# Patient Record
Sex: MALE | Race: WHITE | NOT HISPANIC OR LATINO | Employment: FULL TIME | URBAN - METROPOLITAN AREA
[De-identification: names, ages, dates, MRNs, and addresses within clinical notes are randomized per-mention and may not be internally consistent; named-entity substitution may affect disease eponyms.]

---

## 2020-10-08 RX ORDER — BLOOD SUGAR DIAGNOSTIC
STRIP MISCELLANEOUS
Qty: 400 EACH | Refills: 3 | Status: SHIPPED | OUTPATIENT
Start: 2020-10-08 | End: 2020-12-31 | Stop reason: SDUPTHER

## 2020-10-08 RX ORDER — BLOOD SUGAR DIAGNOSTIC
STRIP MISCELLANEOUS
Qty: 400 EACH | Refills: 3 | Status: SHIPPED | OUTPATIENT
Start: 2020-10-08 | End: 2020-10-08 | Stop reason: SDUPTHER

## 2020-11-06 RX ORDER — INSULIN DETEMIR 100 [IU]/ML
45 INJECTION, SOLUTION SUBCUTANEOUS 2 TIMES DAILY
Qty: 81 ML | Refills: 3 | Status: SHIPPED | OUTPATIENT
Start: 2020-11-06 | End: 2020-11-10 | Stop reason: SDUPTHER

## 2020-11-06 NOTE — TELEPHONE ENCOUNTER
PT'S WIFE CALLED STATING THAT THE PT WOULD LIKE LEVEMIR SENT IN TO [facility]. PLEASE AND THANK YOU

## 2020-11-10 RX ORDER — INSULIN DETEMIR 100 [IU]/ML
INJECTION, SOLUTION SUBCUTANEOUS
Qty: 270 ML | Refills: 1 | Status: SHIPPED | OUTPATIENT
Start: 2020-11-10 | End: 2021-08-17 | Stop reason: SDUPTHER

## 2020-11-10 NOTE — TELEPHONE ENCOUNTER
[facility] pharmacy called requesting a refill for insulin detemir (Levemir FlexTouch) 100 UNIT/ML injection. Patient needs a new script and pharmacy is requesting for it to be Escripted.

## 2020-12-18 ENCOUNTER — TELEPHONE (OUTPATIENT)
Dept: ENDOCRINOLOGY | Facility: CLINIC | Age: 65
End: 2020-12-18

## 2020-12-18 NOTE — TELEPHONE ENCOUNTER
PATIENT'S SPOUSE CALLED STATING THAT MR GIVENS IS CURRENTLY IN [place] AND IS NEEDING AN EMERGENCY REFILL ON PEN NEEDLES FOR HIS LEVEMIR AND HUMALOG PENS. CAN AN RX BE SENT TO [facility] IN [place]. PHONE # [phone]. SPOUSE DID NOT KNOW WHERE THIS PHARMACY IS LOCATED, WAS ONLY PROVIDED THE PHONE # BY MR GIVENS.

## 2020-12-31 ENCOUNTER — OFFICE VISIT (OUTPATIENT)
Dept: ENDOCRINOLOGY | Facility: CLINIC | Age: 65
End: 2020-12-31

## 2020-12-31 VITALS
DIASTOLIC BLOOD PRESSURE: 78 MMHG | BODY MASS INDEX: 33.97 KG/M2 | SYSTOLIC BLOOD PRESSURE: 150 MMHG | OXYGEN SATURATION: 96 % | WEIGHT: 250.8 LBS | TEMPERATURE: 96.6 F | HEIGHT: 72 IN | HEART RATE: 54 BPM

## 2020-12-31 DIAGNOSIS — E11.649 TYPE 2 DIABETES MELLITUS WITH HYPOGLYCEMIA WITHOUT COMA, WITH LONG-TERM CURRENT USE OF INSULIN (HCC): ICD-10-CM

## 2020-12-31 DIAGNOSIS — I10 BENIGN HYPERTENSION: ICD-10-CM

## 2020-12-31 DIAGNOSIS — I25.10 ATHEROSCLEROSIS OF NATIVE CORONARY ARTERY OF NATIVE HEART WITHOUT ANGINA PECTORIS: ICD-10-CM

## 2020-12-31 DIAGNOSIS — Z79.4 INSULIN LONG-TERM USE (HCC): ICD-10-CM

## 2020-12-31 DIAGNOSIS — E11.65 UNCONTROLLED TYPE 2 DIABETES MELLITUS WITH HYPERGLYCEMIA (HCC): Primary | ICD-10-CM

## 2020-12-31 DIAGNOSIS — Z79.4 TYPE 2 DIABETES MELLITUS WITH HYPOGLYCEMIA WITHOUT COMA, WITH LONG-TERM CURRENT USE OF INSULIN (HCC): ICD-10-CM

## 2020-12-31 PROBLEM — E78.2 MIXED HYPERLIPIDEMIA: Status: ACTIVE | Noted: 2020-12-31

## 2020-12-31 LAB
EXPIRATION DATE: NORMAL
HBA1C MFR BLD: 7.7 %
Lab: NORMAL

## 2020-12-31 PROCEDURE — 83036 HEMOGLOBIN GLYCOSYLATED A1C: CPT | Performed by: INTERNAL MEDICINE

## 2020-12-31 PROCEDURE — 99214 OFFICE O/P EST MOD 30 MIN: CPT | Performed by: INTERNAL MEDICINE

## 2020-12-31 RX ORDER — ATORVASTATIN CALCIUM 40 MG/1
TABLET, FILM COATED ORAL
COMMUNITY
Start: 2020-11-10

## 2020-12-31 RX ORDER — EMPAGLIFLOZIN, METFORMIN HYDROCHLORIDE 12.5; 1 MG/1; MG/1
2 TABLET, EXTENDED RELEASE ORAL DAILY
Qty: 60 TABLET | Refills: 5 | Status: SHIPPED | OUTPATIENT
Start: 2020-12-31 | End: 2021-01-04 | Stop reason: SDUPTHER

## 2020-12-31 RX ORDER — BLOOD SUGAR DIAGNOSTIC
STRIP MISCELLANEOUS
Qty: 400 EACH | Refills: 3 | Status: SHIPPED | OUTPATIENT
Start: 2020-12-31 | End: 2021-01-04 | Stop reason: SDUPTHER

## 2020-12-31 RX ORDER — INSULIN LISPRO 100 [IU]/ML
INJECTION, SOLUTION INTRAVENOUS; SUBCUTANEOUS
COMMUNITY
End: 2021-08-17 | Stop reason: SDUPTHER

## 2020-12-31 RX ORDER — SITAGLIPTIN AND METFORMIN HYDROCHLORIDE 1000; 50 MG/1; MG/1
TABLET, FILM COATED, EXTENDED RELEASE ORAL
COMMUNITY
Start: 2020-12-17 | End: 2020-12-31

## 2020-12-31 RX ORDER — LOSARTAN POTASSIUM AND HYDROCHLOROTHIAZIDE 12.5; 5 MG/1; MG/1
TABLET ORAL
COMMUNITY
Start: 2020-11-10

## 2020-12-31 RX ORDER — FUROSEMIDE 20 MG/1
TABLET ORAL
COMMUNITY
Start: 2020-11-10

## 2020-12-31 RX ORDER — METOPROLOL SUCCINATE 100 MG/1
TABLET, EXTENDED RELEASE ORAL
COMMUNITY
Start: 2020-11-10

## 2020-12-31 RX ORDER — ASPIRIN 325 MG
TABLET ORAL
COMMUNITY

## 2020-12-31 NOTE — PROGRESS NOTES
"     Office Note      Date: 2020  Patient Name: River Ching  MRN: [id number]  : 1955    Chief Complaint   Patient presents with   • Diabetes       History of Present Illness:   River Ching is a 65 y.o. male who presents for Diabetes type 2. Diagnosed in: . Treated in past with oral agents. Current treatments: janumet and basal bolus insulin. Number of insulin shots per day: 4. Checks blood sugar 4 times a day. Has low blood sugar: occasional. Aspirin use: Yes. Statin use: Yes. ACE-I/ARB use: Yes. Changes in health since last visit: none. Last eye exam .    Subjective      Diabetic Complications:  Eyes: No  Kidneys: No  Feet: No  Heart: Yes - stents    Diet and Exercise:  Meals per day: 3  Minutes of exercise per week: 0 mins.    Review of Systems:   Review of Systems   Constitutional: Negative.    Cardiovascular: Negative.    Gastrointestinal: Negative.    Endocrine: Negative.        The following portions of the patient's history were reviewed and updated as appropriate: allergies, current medications, past family history, past medical history, past social history, past surgical history and problem list.    Objective       Visit Vitals  /78   Pulse 54   Temp 96.6 °F (35.9 °C) (Infrared)   Ht 182.9 cm (72\")   Wt 114 kg (250 lb 12.8 oz)   SpO2 96%   BMI 34.01 kg/m²       Physical Exam:  Physical Exam  Constitutional:       Appearance: Normal appearance.   Neurological:      Mental Status: He is alert.         Labs:    HbA1c  Lab Results   Component Value Date    HGBA1C 7.7 2020       CMP  No results found for: GLUCOSE, BUN, CREATININE, EGFRIFNONA, EGFRIFAFRI, BCR, K, CO2, CALCIUM, PROTENTOTREF, LABIL2, BILIRUBIN, AST, ALT     Lipid Panel        TSH  No results found for: TSH, FREET4     Hemoglobin A1C  Lab Results   Component Value Date    HGBA1C 7.7 2020        Microalbumin/Creatinine  No results found for: MALBCRERATIO, CREATINIURIN, MICROALBUR        Assessment / Plan  "     Assessment & Plan:  Problem List Items Addressed This Visit        Other    Uncontrolled type 2 diabetes mellitus with hyperglycemia (CMS/[facility]) - Primary    Current Assessment & Plan     Diabetes is improving with treatment.   Continue current treatment regimen.  Diabetes will be reassessed in 3 months.    A1c better but above goal.  We discussed change from janumet to SGLT-2 inhibitor, especially with h/o CAD.         Relevant Medications    insulin detemir (Levemir FlexTouch) 100 UNIT/ML injection    Insulin Lispro, 1 Unit Dial, (HumaLOG KwikPen) 100 UNIT/ML solution pen-injector    Empagliflozin-metFORMIN HCl ER (Synjardy XR) 12.5-1000 MG tablet sustained-release 24 hour    Other Relevant Orders    POC Glycosylated Hemoglobin (Hb A1C) (Completed)    Type 2 diabetes mellitus with hypoglycemia, with long-term current use of insulin (CMS/[facility])    Current Assessment & Plan     We have prescribed CGM but it was too expensive unfortunately.         Relevant Medications    insulin detemir (Levemir FlexTouch) 100 UNIT/ML injection    Insulin Lispro, 1 Unit Dial, (HumaLOG KwikPen) 100 UNIT/ML solution pen-injector    Empagliflozin-metFORMIN HCl ER (Synjardy XR) 12.5-1000 MG tablet sustained-release 24 hour    Benign hypertension    Current Assessment & Plan     Hypertension is unchanged.  Continue current treatment regimen. But add SGLT-2 inhibitor.  Blood pressure will be reassessed in 3 months.         Relevant Medications    metoprolol succinate XL (TOPROL-XL) 100 MG 24 hr tablet    losartan-hydrochlorothiazide (HYZAAR) 50-12.5 MG per tablet    furosemide (LASIX) 20 MG tablet    Atherosclerosis of native coronary artery of native heart without angina pectoris    Current Assessment & Plan     Coronary artery disease is unchanged.  Medication changes per orders.  Cardiac status will be reassessed in 3 months.    Start SGLT-2 inhibitor.         Relevant Medications    metoprolol succinate XL (TOPROL-XL) 100 MG 24 hr  tablet    Insulin long-term use (CMS/[facility])           Return in about 3 months (around 3/31/2021) for Recheck with A1c, CMP.    Dat Manning MD   12/31/2020

## 2020-12-31 NOTE — ASSESSMENT & PLAN NOTE
Hypertension is unchanged.  Continue current treatment regimen. But add SGLT-2 inhibitor.  Blood pressure will be reassessed in 3 months.

## 2020-12-31 NOTE — ASSESSMENT & PLAN NOTE
Coronary artery disease is unchanged.  Medication changes per orders.  Cardiac status will be reassessed in 3 months.    Start SGLT-2 inhibitor.

## 2020-12-31 NOTE — ASSESSMENT & PLAN NOTE
Diabetes is improving with treatment.   Continue current treatment regimen.  Diabetes will be reassessed in 3 months.    A1c better but above goal.  We discussed change from janumet to SGLT-2 inhibitor, especially with h/o CAD.

## 2021-01-04 RX ORDER — EMPAGLIFLOZIN, METFORMIN HYDROCHLORIDE 12.5; 1 MG/1; MG/1
2 TABLET, EXTENDED RELEASE ORAL DAILY
Qty: 60 TABLET | Refills: 5 | Status: SHIPPED | OUTPATIENT
Start: 2021-01-04 | End: 2021-01-04 | Stop reason: SDUPTHER

## 2021-01-04 RX ORDER — EMPAGLIFLOZIN, METFORMIN HYDROCHLORIDE 12.5; 1 MG/1; MG/1
2 TABLET, EXTENDED RELEASE ORAL DAILY
Qty: 180 TABLET | Refills: 1 | Status: SHIPPED | OUTPATIENT
Start: 2021-01-04 | End: 2021-08-17 | Stop reason: SDUPTHER

## 2021-01-04 RX ORDER — BLOOD SUGAR DIAGNOSTIC
STRIP MISCELLANEOUS
Qty: 400 EACH | Refills: 3 | Status: SHIPPED | OUTPATIENT
Start: 2021-01-04 | End: 2021-12-29 | Stop reason: SDUPTHER

## 2021-01-04 RX ORDER — EMPAGLIFLOZIN AND METFORMIN HYDROCHLORIDE 12.5; 1 MG/1; MG/1
TABLET ORAL
Qty: 60 TABLET | Status: CANCELLED | OUTPATIENT
Start: 2021-01-04

## 2021-01-04 NOTE — TELEPHONE ENCOUNTER
Patient's wife called and said the medication that was ordered on 12/31 was sent to the wrong location. Please send an order of the Synjardy and Verio to [facility].

## 2021-02-19 RX ORDER — PEN NEEDLE, DIABETIC 30 GX3/16"
1 NEEDLE, DISPOSABLE MISCELLANEOUS
Qty: 30 EACH | Refills: 0 | Status: SHIPPED | OUTPATIENT
Start: 2021-02-19 | End: 2021-12-29 | Stop reason: SDUPTHER

## 2021-04-08 ENCOUNTER — LAB (OUTPATIENT)
Dept: LAB | Facility: HOSPITAL | Age: 66
End: 2021-04-08

## 2021-04-08 ENCOUNTER — OFFICE VISIT (OUTPATIENT)
Dept: ENDOCRINOLOGY | Facility: CLINIC | Age: 66
End: 2021-04-08

## 2021-04-08 VITALS
HEIGHT: 72 IN | TEMPERATURE: 97.1 F | SYSTOLIC BLOOD PRESSURE: 130 MMHG | WEIGHT: 238.2 LBS | BODY MASS INDEX: 32.26 KG/M2 | DIASTOLIC BLOOD PRESSURE: 80 MMHG

## 2021-04-08 DIAGNOSIS — Z79.4 INSULIN LONG-TERM USE (HCC): ICD-10-CM

## 2021-04-08 DIAGNOSIS — E11.65 UNCONTROLLED TYPE 2 DIABETES MELLITUS WITH HYPERGLYCEMIA (HCC): ICD-10-CM

## 2021-04-08 DIAGNOSIS — E11.649 TYPE 2 DIABETES MELLITUS WITH HYPOGLYCEMIA WITHOUT COMA, WITH LONG-TERM CURRENT USE OF INSULIN (HCC): ICD-10-CM

## 2021-04-08 DIAGNOSIS — I10 BENIGN HYPERTENSION: ICD-10-CM

## 2021-04-08 DIAGNOSIS — I25.10 ATHEROSCLEROSIS OF NATIVE CORONARY ARTERY OF NATIVE HEART WITHOUT ANGINA PECTORIS: ICD-10-CM

## 2021-04-08 DIAGNOSIS — E11.65 UNCONTROLLED TYPE 2 DIABETES MELLITUS WITH HYPERGLYCEMIA (HCC): Primary | ICD-10-CM

## 2021-04-08 DIAGNOSIS — Z79.4 TYPE 2 DIABETES MELLITUS WITH HYPOGLYCEMIA WITHOUT COMA, WITH LONG-TERM CURRENT USE OF INSULIN (HCC): ICD-10-CM

## 2021-04-08 LAB
ALBUMIN SERPL-MCNC: 4.5 G/DL (ref 3.5–5.2)
ALBUMIN/GLOB SERPL: 1.4 G/DL
ALP SERPL-CCNC: 81 U/L (ref 39–117)
ALT SERPL W P-5'-P-CCNC: 20 U/L (ref 1–41)
ANION GAP SERPL CALCULATED.3IONS-SCNC: 8.3 MMOL/L (ref 5–15)
AST SERPL-CCNC: 21 U/L (ref 1–40)
BILIRUB SERPL-MCNC: 0.7 MG/DL (ref 0–1.2)
BUN SERPL-MCNC: 12 MG/DL (ref 8–23)
BUN/CREAT SERPL: 14.3 (ref 7–25)
CALCIUM SPEC-SCNC: 9.4 MG/DL (ref 8.6–10.5)
CHLORIDE SERPL-SCNC: 103 MMOL/L (ref 98–107)
CO2 SERPL-SCNC: 29.7 MMOL/L (ref 22–29)
CREAT SERPL-MCNC: 0.84 MG/DL (ref 0.76–1.27)
EXPIRATION DATE: NORMAL
GFR SERPL CREATININE-BSD FRML MDRD: 91 ML/MIN/1.73
GLOBULIN UR ELPH-MCNC: 3.3 GM/DL
GLUCOSE SERPL-MCNC: 121 MG/DL (ref 65–99)
HBA1C MFR BLD: 6.8 %
Lab: NORMAL
POTASSIUM SERPL-SCNC: 4.1 MMOL/L (ref 3.5–5.2)
PROT SERPL-MCNC: 7.8 G/DL (ref 6–8.5)
SODIUM SERPL-SCNC: 141 MMOL/L (ref 136–145)

## 2021-04-08 PROCEDURE — 83036 HEMOGLOBIN GLYCOSYLATED A1C: CPT | Performed by: INTERNAL MEDICINE

## 2021-04-08 PROCEDURE — 80053 COMPREHEN METABOLIC PANEL: CPT

## 2021-04-08 PROCEDURE — 99214 OFFICE O/P EST MOD 30 MIN: CPT | Performed by: INTERNAL MEDICINE

## 2021-04-08 NOTE — PROGRESS NOTES
"     Office Note      Date: 2021  Patient Name: River Ching  MRN: [id number]  : 1955    Chief Complaint   Patient presents with   • Follow-up   • Diabetes       History of Present Illness:   River Ching is a 66 y.o. male who presents for Diabetes type 2. Diagnosed in: . Treated in past with oral agents. Current treatments: synjardy and basal bolus insulin. Number of insulin shots per day: 2. Checks blood sugar 4 times a day. Has low blood sugar: occasional. Aspirin use: Yes. Statin use: Yes. ACE-I/ARB use: Yes. Changes in health since last visit: none. Last eye exam .    At the last visit we changed janumet to synjardy.  His FSBS have improved - he has had to decrease insulin.  He has lost weight.    Subjective      Diabetic Complications:  Eyes: No  Kidneys: No  Feet: No  Heart: Yes - stents    Diet and Exercise:  Meals per day: 3  Minutes of exercise per week: 0 mins.    Review of Systems:   Review of Systems   Constitutional: Negative.    Cardiovascular: Negative.    Gastrointestinal: Negative.    Endocrine: Negative.        The following portions of the patient's history were reviewed and updated as appropriate: allergies, current medications, past family history, past medical history, past social history, past surgical history and problem list.    Objective       Visit Vitals  /80   Temp 97.1 °F (36.2 °C) (Infrared)   Ht 182.9 cm (72\")   Wt 108 kg (238 lb 3.2 oz)   BMI 32.31 kg/m²       Physical Exam:  Physical Exam  Constitutional:       Appearance: Normal appearance.   Neurological:      Mental Status: He is alert.         Labs:    HbA1c  Lab Results   Component Value Date    HGBA1C 6.8 2021       CMP  No results found for: GLUCOSE, BUN, CREATININE, EGFRIFNONA, EGFRIFAFRI, BCR, K, CO2, CALCIUM, PROTENTOTREF, LABIL2, BILIRUBIN, AST, ALT     Lipid Panel        TSH  No results found for: TSH, FREET4     Hemoglobin A1C  Lab Results   Component Value Date    HGBA1C 6.8 " 04/08/2021        Microalbumin/Creatinine  No results found for: MALBCRERATIO, CREATINIURIN, MICROALBUR        Assessment / Plan      Assessment & Plan:  Diagnoses and all orders for this visit:    1. Uncontrolled type 2 diabetes mellitus with hyperglycemia (CMS/HCC) (Primary)  Assessment & Plan:  Diabetes is improving with treatment.   Continue current treatment regimen.  Diabetes will be reassessed in 3 months.    Orders:  -     POC Glycosylated Hemoglobin (Hb A1C)  -     Comprehensive Metabolic Panel; Future    2. Type 2 diabetes mellitus with hypoglycemia without coma, with long-term current use of insulin (CMS/[facility])  Assessment & Plan:  Continue frequent FSBS.  Continue to decrease insulin as needed.      3. Benign hypertension  Assessment & Plan:  Hypertension is improving with treatment.  Continue current treatment regimen.  Blood pressure will be reassessed at the next regular appointment.      4. Atherosclerosis of native coronary artery of native heart without angina pectoris  Assessment & Plan:  Continue ASA, statin, and SGLT-2 inhibitor.      5. Insulin long-term use (CMS/[facility])      Return in about 3 months (around 7/8/2021) for Recheck with A1c, CMP, lipids, TSH, microalbumin.    Dat Manning MD   04/08/2021

## 2021-08-05 RX ORDER — FLURBIPROFEN SODIUM 0.3 MG/ML
SOLUTION/ DROPS OPHTHALMIC
Qty: 450 EACH | Refills: 1 | Status: SHIPPED | OUTPATIENT
Start: 2021-08-05 | End: 2021-12-29 | Stop reason: SDUPTHER

## 2021-08-17 ENCOUNTER — LAB (OUTPATIENT)
Dept: LAB | Facility: HOSPITAL | Age: 66
End: 2021-08-17

## 2021-08-17 ENCOUNTER — MEDICATION THERAPY MANAGEMENT (OUTPATIENT)
Dept: ENDOCRINOLOGY | Facility: CLINIC | Age: 66
End: 2021-08-17

## 2021-08-17 ENCOUNTER — OFFICE VISIT (OUTPATIENT)
Dept: ENDOCRINOLOGY | Facility: CLINIC | Age: 66
End: 2021-08-17

## 2021-08-17 VITALS
WEIGHT: 221 LBS | SYSTOLIC BLOOD PRESSURE: 108 MMHG | DIASTOLIC BLOOD PRESSURE: 66 MMHG | HEART RATE: 55 BPM | BODY MASS INDEX: 29.93 KG/M2 | HEIGHT: 72 IN | OXYGEN SATURATION: 96 %

## 2021-08-17 DIAGNOSIS — E11.65 UNCONTROLLED TYPE 2 DIABETES MELLITUS WITH HYPERGLYCEMIA (HCC): ICD-10-CM

## 2021-08-17 DIAGNOSIS — I25.10 ATHEROSCLEROSIS OF NATIVE CORONARY ARTERY OF NATIVE HEART WITHOUT ANGINA PECTORIS: ICD-10-CM

## 2021-08-17 DIAGNOSIS — Z79.4 INSULIN LONG-TERM USE (HCC): ICD-10-CM

## 2021-08-17 DIAGNOSIS — E78.2 MIXED HYPERLIPIDEMIA: ICD-10-CM

## 2021-08-17 DIAGNOSIS — Z79.4 TYPE 2 DIABETES MELLITUS WITH HYPOGLYCEMIA WITHOUT COMA, WITH LONG-TERM CURRENT USE OF INSULIN (HCC): ICD-10-CM

## 2021-08-17 DIAGNOSIS — E11.65 UNCONTROLLED TYPE 2 DIABETES MELLITUS WITH HYPERGLYCEMIA (HCC): Primary | ICD-10-CM

## 2021-08-17 DIAGNOSIS — E11.649 TYPE 2 DIABETES MELLITUS WITH HYPOGLYCEMIA WITHOUT COMA, WITH LONG-TERM CURRENT USE OF INSULIN (HCC): ICD-10-CM

## 2021-08-17 DIAGNOSIS — I10 BENIGN HYPERTENSION: ICD-10-CM

## 2021-08-17 LAB
ALBUMIN SERPL-MCNC: 4.4 G/DL (ref 3.5–5.2)
ALBUMIN UR-MCNC: <1.2 MG/DL
ALBUMIN/GLOB SERPL: 1.3 G/DL
ALP SERPL-CCNC: 88 U/L (ref 39–117)
ALT SERPL W P-5'-P-CCNC: 13 U/L (ref 1–41)
ANION GAP SERPL CALCULATED.3IONS-SCNC: 12.1 MMOL/L (ref 5–15)
AST SERPL-CCNC: 18 U/L (ref 1–40)
BILIRUB SERPL-MCNC: 1 MG/DL (ref 0–1.2)
BUN SERPL-MCNC: 10 MG/DL (ref 8–23)
BUN/CREAT SERPL: 11.2 (ref 7–25)
CALCIUM SPEC-SCNC: 9.8 MG/DL (ref 8.6–10.5)
CHLORIDE SERPL-SCNC: 101 MMOL/L (ref 98–107)
CHOLEST SERPL-MCNC: 122 MG/DL (ref 0–200)
CO2 SERPL-SCNC: 27.9 MMOL/L (ref 22–29)
CREAT SERPL-MCNC: 0.89 MG/DL (ref 0.76–1.27)
CREAT UR-MCNC: 61 MG/DL
EXPIRATION DATE: ABNORMAL
EXPIRATION DATE: NORMAL
GFR SERPL CREATININE-BSD FRML MDRD: 85 ML/MIN/1.73
GLOBULIN UR ELPH-MCNC: 3.3 GM/DL
GLUCOSE BLDC GLUCOMTR-MCNC: 151 MG/DL (ref 70–130)
GLUCOSE SERPL-MCNC: 140 MG/DL (ref 65–99)
HBA1C MFR BLD: 6.9 %
HDLC SERPL-MCNC: 35 MG/DL (ref 40–60)
LDLC SERPL CALC-MCNC: 66 MG/DL (ref 0–100)
LDLC/HDLC SERPL: 1.85 {RATIO}
Lab: ABNORMAL
Lab: NORMAL
MICROALBUMIN/CREAT UR: NORMAL MG/G{CREAT}
POTASSIUM SERPL-SCNC: 4.7 MMOL/L (ref 3.5–5.2)
PROT SERPL-MCNC: 7.7 G/DL (ref 6–8.5)
SODIUM SERPL-SCNC: 141 MMOL/L (ref 136–145)
TRIGL SERPL-MCNC: 112 MG/DL (ref 0–150)
TSH SERPL DL<=0.05 MIU/L-ACNC: 0.89 UIU/ML (ref 0.27–4.2)
VLDLC SERPL-MCNC: 21 MG/DL (ref 5–40)

## 2021-08-17 PROCEDURE — 99214 OFFICE O/P EST MOD 30 MIN: CPT | Performed by: INTERNAL MEDICINE

## 2021-08-17 PROCEDURE — 80061 LIPID PANEL: CPT

## 2021-08-17 PROCEDURE — 80053 COMPREHEN METABOLIC PANEL: CPT

## 2021-08-17 PROCEDURE — 83036 HEMOGLOBIN GLYCOSYLATED A1C: CPT | Performed by: INTERNAL MEDICINE

## 2021-08-17 PROCEDURE — 82043 UR ALBUMIN QUANTITATIVE: CPT

## 2021-08-17 PROCEDURE — 84443 ASSAY THYROID STIM HORMONE: CPT

## 2021-08-17 PROCEDURE — 82570 ASSAY OF URINE CREATININE: CPT

## 2021-08-17 PROCEDURE — 82947 ASSAY GLUCOSE BLOOD QUANT: CPT | Performed by: INTERNAL MEDICINE

## 2021-08-17 RX ORDER — INSULIN DETEMIR 100 [IU]/ML
40 INJECTION, SOLUTION SUBCUTANEOUS 2 TIMES DAILY
Qty: 75 ML | Refills: 1 | Status: SHIPPED | OUTPATIENT
Start: 2021-08-17 | End: 2021-12-29 | Stop reason: SDUPTHER

## 2021-08-17 RX ORDER — EMPAGLIFLOZIN, METFORMIN HYDROCHLORIDE 12.5; 1 MG/1; MG/1
2 TABLET, EXTENDED RELEASE ORAL DAILY
Qty: 180 TABLET | Refills: 1 | Status: SHIPPED | OUTPATIENT
Start: 2021-08-17 | End: 2021-12-29 | Stop reason: SDUPTHER

## 2021-08-17 RX ORDER — INSULIN LISPRO 100 [IU]/ML
INJECTION, SOLUTION INTRAVENOUS; SUBCUTANEOUS
Qty: 45 ML | Refills: 1 | Status: SHIPPED | OUTPATIENT
Start: 2021-08-17 | End: 2021-12-29 | Stop reason: SDUPTHER

## 2021-08-17 NOTE — PROGRESS NOTES
"     Office Note      Date: 2021  Patient Name: River Ching  MRN: [id number]  : 1955    Chief Complaint   Patient presents with   • Diabetes       History of Present Illness:   River Ching is a 66 y.o. male who presents for Diabetes type 2. Diagnosed in: . Treated in past with oral agents. Current treatments: synjardy and basal bolus insulin. Number of insulin shots per day: 2. Checks blood sugar 4 times a day. Has low blood sugar: occasional. Aspirin use: Yes. Statin use: Yes. ACE-I/ARB use: Yes. Changes in health since last visit: none. Last eye exam .    He has been able to lose more weight.  He has been able to decrease insulin some more.      Subjective      Diabetic Complications:  Eyes: No  Kidneys: No  Feet: No  Heart: Yes - stents    Diet and Exercise:  Meals per day: 3  Minutes of exercise per week: 0 mins.    Review of Systems:   Review of Systems   Constitutional: Negative.    Cardiovascular: Negative.    Gastrointestinal: Negative.    Endocrine: Negative.        The following portions of the patient's history were reviewed and updated as appropriate: allergies, current medications, past family history, past medical history, past social history, past surgical history and problem list.    Objective       Visit Vitals  /66   Pulse 55   Ht 182.9 cm (72\")   Wt 100 kg (221 lb)   SpO2 96%   BMI 29.97 kg/m²       Physical Exam:  Physical Exam  Constitutional:       Appearance: Normal appearance.   Cardiovascular:      Pulses:           Dorsalis pedis pulses are 2+ on the right side and 2+ on the left side.        Posterior tibial pulses are 2+ on the right side and 2+ on the left side.   Feet:      Right foot:      Protective Sensation: 5 sites tested. 5 sites sensed.      Skin integrity: Skin integrity normal.      Toenail Condition: Right toenails are abnormally thick.      Left foot:      Protective Sensation: 5 sites tested. 5 sites sensed.      Skin integrity: Skin integrity " normal.      Toenail Condition: Left toenails are abnormally thick.   Neurological:      Mental Status: He is alert.         Labs:    HbA1c  Lab Results   Component Value Date    HGBA1C 6.9 08/17/2021       CMP  Lab Results   Component Value Date    GLUCOSE 121 (H) 04/08/2021    BUN 12 04/08/2021    CREATININE 0.84 04/08/2021    EGFRIFNONA 91 04/08/2021    BCR 14.3 04/08/2021    K 4.1 04/08/2021    CO2 29.7 (H) 04/08/2021    CALCIUM 9.4 04/08/2021    AST 21 04/08/2021    ALT 20 04/08/2021        Lipid Panel        TSH  No results found for: TSH, FREET4     Hemoglobin A1C  Lab Results   Component Value Date    HGBA1C 6.9 08/17/2021        Microalbumin/Creatinine  No results found for: MALBCRERATIO, CREATINIURIN, MICROALBUR        Assessment / Plan      Assessment & Plan:  Diagnoses and all orders for this visit:    1. Uncontrolled type 2 diabetes mellitus with hyperglycemia (CMS/[facility]) (Primary)  Assessment & Plan:  Diabetes is unchanged.   Continue current treatment regimen.  Diabetes will be reassessed in 3 months.    Orders:  -     POC Glycosylated Hemoglobin (Hb A1C)  -     POC Glucose, Blood  -     Comprehensive Metabolic Panel; Future  -     Lipid Panel; Future  -     Microalbumin / Creatinine Urine Ratio - Urine, Clean Catch; Future  -     TSH; Future    2. Type 2 diabetes mellitus with hypoglycemia without coma, with long-term current use of insulin (CMS/[facility])  Assessment & Plan:  Continue frequent FSBS.  Insurance wouldn't cover CGM.      3. Benign hypertension  Assessment & Plan:  Hypertension is improving with treatment.  Continue current treatment regimen.  Blood pressure will be reassessed at the next regular appointment.      4. Mixed hyperlipidemia  Assessment & Plan:  Continue statin.      5. Atherosclerosis of native coronary artery of native heart without angina pectoris  Assessment & Plan:  Continue ASA, statin and SGLT-2 inhibitor.      6. Insulin long-term use (CMS/[facility])      Return in about 3 months  (around 11/17/2021) for Recheck with A1c.    Dat Manning MD   08/17/2021

## 2021-08-17 NOTE — PROGRESS NOTES
[facility] Pharmacy Visit [facility] Endocrinology    River Ching is a 66 y.o. male with T2DM seen by Endocrinology and assessed by the Medication Management Clinic Pharmacist at [facility]. This was an Initial MTM visit for River Ching.    River Ching was introduced to the [facility] and allergies, PMH, and medications were reviewed.    Insurance Coverage/Eligibility:  • [facility]   • Patient is Eligible for [facility]    Past Medical History:   Diagnosis Date   • Benign hypertension    • CAD (coronary artery disease)     stents   • Coronary arteriosclerosis    • Hypoglycemia due to type 2 diabetes mellitus (CMS/[facility])    • Long term (current) use of insulin (CMS/[facility])    • Mixed hyperlipidemia    • Obesity     body mass index 30+-obesity   • Type 2 diabetes mellitus, uncontrolled (CMS/[facility])      Social History     Socioeconomic History   • Marital status:      Spouse name: Not on file   • Number of children: Not on file   • Years of education: Not on file   • Highest education level: Not on file   Tobacco Use   • Smoking status: Never Smoker   • Smokeless tobacco: Never Used   Vaping Use   • Vaping Use: Never used   Substance and Sexual Activity   • Alcohol use: Never   • Drug use: Never   • Sexual activity: Defer       Medication Allergies:  Patient has no known allergies.    Current Medication List:    Current Outpatient Medications:   •  aspirin 325 MG tablet, aspirin 325 mg tablet,delayed release, Disp: , Rfl:   •  atorvastatin (LIPITOR) 40 MG tablet, daily, Disp: , Rfl:   •  Empagliflozin-metFORMIN HCl ER (Synjardy XR) 12.5-1000 MG tablet sustained-release 24 hour, Take 2 tablets by mouth Daily., Disp: 180 tablet, Rfl: 1  •  furosemide (LASIX) 20 MG tablet, , Disp: , Rfl:   •  glucose blood (OneTouch Verio) test strip, Use 4x per day; ICD-10 E11.65; Z79.4, Disp: 400 each, Rfl: 3  •  insulin detemir (Levemir FlexTouch) 100 UNIT/ML  injection, Inject 45 units bid, Disp: 270 mL, Rfl: 1  •  Insulin Lispro, 1 Unit Dial, (HumaLOG KwikPen) 100 UNIT/ML solution pen-injector, Max dose of 44 units per day, Disp: , Rfl:   •  Insulin Pen Needle (B-D UF III MINI PEN NEEDLES) 31G X 5 MM misc, USE AS DIRECTED 5 TIMES    DAILY, Disp: 450 each, Rfl: 1  •  Insulin Pen Needle (Pen Needles) 31G X 5 MM misc, 1 each 5 (Five) Times a Day., Disp: 30 each, Rfl: 0  •  losartan-hydrochlorothiazide (HYZAAR) 50-12.5 MG per tablet, daily, Disp: , Rfl:   •  metoprolol succinate XL (TOPROL-XL) 100 MG 24 hr tablet, , Disp: , Rfl:     Vitals/Labs:  BP: (108)/(66) 108/66   Heart Rate:  [55] 55      There were no vitals filed for this visit.  Lab Results   Component Value Date    HGBA1C 6.9 08/17/2021     Lab Results   Component Value Date    GLUCOSE 121 (H) 04/08/2021    CALCIUM 9.4 04/08/2021     04/08/2021    K 4.1 04/08/2021    CO2 29.7 (H) 04/08/2021     04/08/2021    BUN 12 04/08/2021    CREATININE 0.84 04/08/2021    EGFRIFNONA 91 04/08/2021    BCR 14.3 04/08/2021    ANIONGAP 8.3 04/08/2021     No results found for: CHOL, CHLPL, TRIG, HDL, LDL, LDLDIRECT       Drug-Drug Interactions:   • No clinically significant DDI identified per chart review     Medication Assessment:   1. Aspirin - Currently Taking  2. Statin - Currently Taking  3. ACEi/ARB - Currently Taking    Medication Education on Specialty Medication:  The patient was provided medication education via verbal and written information on new and/or current target medications. Patient expressed understanding and had no further questions at this time.    Synjardy XR (metformin/empagliflozin)   • Discussed the medication's MOA and that it may cause more frequent urination particularly upon starting the medication  • Take one tablet in the morning with or without food    • Encouraged to drink plenty of water as to not get dehydrated especially in warmer weather or with activity  • Also discussed there may  be an increased incidence of UTIs or yeast infections and to monitor for signs/symptoms  • Encouraged to take with food as it may cause N/V/D if taken on empty stomach      Assessment & Plan:  1. Provider Changes This Visit: None, Patient self-decreasing insulin dose to prevent hypoglycemia with weight loss   2. Therapy Modifications Recommended: None at This Time   3. Provided contact information for the pharmacy team and discussed [facility] services available to patient, including: monitoring of refills, prior authorizations, and copay coupon cards, as applicable, as well as curb-side pick-up or mail-order as needed.   4. Mr. Ching is currently filling prescriptions through [facility] [facility] home delivery without issue. Discussed anticipated copay amounts with patient; however, he is unsure of current medication costs. Will discuss with wife and call writer back if interested in converting to  Retail Pharmacy in future.   5. PharmD Follow Up: Plan to f/u with pt at next provider appointment or sooner if contacted by patient.      Su Don, PharmD  8/17/2021  09:58 EDT

## 2021-11-05 ENCOUNTER — SPECIALTY PHARMACY (OUTPATIENT)
Dept: ENDOCRINOLOGY | Facility: CLINIC | Age: 66
End: 2021-11-05

## 2021-11-05 NOTE — PROGRESS NOTES
Patient declined filling specialty medication at [facility] and/or enrollment in the Patient Management Program.

## 2021-12-29 ENCOUNTER — OFFICE VISIT (OUTPATIENT)
Dept: ENDOCRINOLOGY | Facility: CLINIC | Age: 66
End: 2021-12-29

## 2021-12-29 VITALS
HEART RATE: 49 BPM | SYSTOLIC BLOOD PRESSURE: 136 MMHG | WEIGHT: 220 LBS | BODY MASS INDEX: 29.8 KG/M2 | OXYGEN SATURATION: 99 % | DIASTOLIC BLOOD PRESSURE: 70 MMHG | HEIGHT: 72 IN

## 2021-12-29 DIAGNOSIS — I10 BENIGN HYPERTENSION: ICD-10-CM

## 2021-12-29 DIAGNOSIS — E78.2 MIXED HYPERLIPIDEMIA: ICD-10-CM

## 2021-12-29 DIAGNOSIS — Z79.4 INSULIN LONG-TERM USE (HCC): ICD-10-CM

## 2021-12-29 DIAGNOSIS — E11.65 UNCONTROLLED TYPE 2 DIABETES MELLITUS WITH HYPERGLYCEMIA (HCC): Primary | ICD-10-CM

## 2021-12-29 DIAGNOSIS — E11.649 TYPE 2 DIABETES MELLITUS WITH HYPOGLYCEMIA WITHOUT COMA, WITH LONG-TERM CURRENT USE OF INSULIN (HCC): ICD-10-CM

## 2021-12-29 DIAGNOSIS — Z79.4 TYPE 2 DIABETES MELLITUS WITH HYPOGLYCEMIA WITHOUT COMA, WITH LONG-TERM CURRENT USE OF INSULIN (HCC): ICD-10-CM

## 2021-12-29 DIAGNOSIS — I25.10 ATHEROSCLEROSIS OF NATIVE CORONARY ARTERY OF NATIVE HEART WITHOUT ANGINA PECTORIS: ICD-10-CM

## 2021-12-29 LAB
EXPIRATION DATE: ABNORMAL
EXPIRATION DATE: NORMAL
GLUCOSE BLDC GLUCOMTR-MCNC: 105 MG/DL (ref 70–130)
HBA1C MFR BLD: 7.3 %
Lab: ABNORMAL
Lab: NORMAL

## 2021-12-29 PROCEDURE — 83036 HEMOGLOBIN GLYCOSYLATED A1C: CPT | Performed by: INTERNAL MEDICINE

## 2021-12-29 PROCEDURE — 82947 ASSAY GLUCOSE BLOOD QUANT: CPT | Performed by: INTERNAL MEDICINE

## 2021-12-29 PROCEDURE — 99214 OFFICE O/P EST MOD 30 MIN: CPT | Performed by: INTERNAL MEDICINE

## 2021-12-29 RX ORDER — FLURBIPROFEN SODIUM 0.3 MG/ML
SOLUTION/ DROPS OPHTHALMIC
Qty: 450 EACH | Refills: 1 | Status: SHIPPED | OUTPATIENT
Start: 2021-12-29 | End: 2022-05-02

## 2021-12-29 RX ORDER — EMPAGLIFLOZIN, METFORMIN HYDROCHLORIDE 12.5; 1 MG/1; MG/1
2 TABLET, EXTENDED RELEASE ORAL DAILY
Qty: 180 TABLET | Refills: 3 | Status: SHIPPED | OUTPATIENT
Start: 2021-12-29 | End: 2022-03-21 | Stop reason: SDUPTHER

## 2021-12-29 RX ORDER — BLOOD SUGAR DIAGNOSTIC
STRIP MISCELLANEOUS
Qty: 400 EACH | Refills: 3 | Status: SHIPPED | OUTPATIENT
Start: 2021-12-29

## 2021-12-29 RX ORDER — INSULIN DETEMIR 100 [IU]/ML
40 INJECTION, SOLUTION SUBCUTANEOUS 2 TIMES DAILY
Qty: 75 ML | Refills: 3 | Status: SHIPPED | OUTPATIENT
Start: 2021-12-29 | End: 2022-12-13 | Stop reason: SDUPTHER

## 2021-12-29 RX ORDER — INSULIN LISPRO 100 [IU]/ML
INJECTION, SOLUTION INTRAVENOUS; SUBCUTANEOUS
Qty: 45 ML | Refills: 1 | Status: SHIPPED | OUTPATIENT
Start: 2021-12-29 | End: 2022-06-20 | Stop reason: SDUPTHER

## 2021-12-29 NOTE — ASSESSMENT & PLAN NOTE
Diabetes is worsening.  A1c just above goal at 7.3%.  Continue current treatment regimen.  Continue weight loss efforts.  Diabetes will be reassessed in 3 months.

## 2021-12-29 NOTE — PROGRESS NOTES
"     Office Note      Date: 2021  Patient Name: River Ching  MRN: [id number]  : 1955    Chief Complaint   Patient presents with   • Diabetes     Type II       History of Present Illness:   River Ching is a 66 y.o. male who presents for Diabetes type 2. Diagnosed in: . Treated in past with oral agents. Current treatments: synjardy and basal bolus insulin. Number of insulin shots per day: 2. Checks blood sugar 4 times a day. Has low blood sugar: occasional. Aspirin use: Yes. Statin use: Yes. ACE-I/ARB use: Yes. Changes in health since last visit: none. Last eye exam .    He has decreased insulin doses further.      Subjective      Diabetic Complications:  Eyes: No  Kidneys: No  Feet: No  Heart: Yes - stents    Diet and Exercise:  Meals per day: 3  Minutes of exercise per week: 0 mins.    Review of Systems:   Review of Systems   Constitutional: Negative.    Cardiovascular: Negative.    Gastrointestinal: Negative.    Endocrine: Negative.        The following portions of the patient's history were reviewed and updated as appropriate: allergies, current medications, past family history, past medical history, past social history, past surgical history and problem list.    Objective       Visit Vitals  /70 (BP Location: Left arm, Patient Position: Sitting, Cuff Size: Adult)   Pulse (!) 49   Ht 182.9 cm (72\")   Wt 99.8 kg (220 lb)   SpO2 99%   BMI 29.84 kg/m²       Physical Exam:  Physical Exam  Constitutional:       Appearance: Normal appearance.   Neurological:      Mental Status: He is alert.         Labs:    HbA1c  Lab Results   Component Value Date    HGBA1C 7.3 2021       CMP  Lab Results   Component Value Date    GLUCOSE 140 (H) 2021    BUN 10 2021    CREATININE 0.89 2021    EGFRIFNONA 85 2021    BCR 11.2 2021    K 4.7 2021    CO2 27.9 2021    CALCIUM 9.8 2021    AST 18 2021    ALT 13 2021        Lipid Panel  Lab Results "   Component Value Date    HDL 35 (L) 08/17/2021    LDL 66 08/17/2021    TRIG 112 08/17/2021        TSH  Lab Results   Component Value Date    TSH 0.892 08/17/2021        Hemoglobin A1C  Lab Results   Component Value Date    HGBA1C 7.3 12/29/2021        Microalbumin/Creatinine  Lab Results   Component Value Date    MALBCRERATIO  08/17/2021      Comment:      Unable to calculate    MICROALBUR <1.2 08/17/2021           Assessment / Plan      Assessment & Plan:  Diagnoses and all orders for this visit:    1. Uncontrolled type 2 diabetes mellitus with hyperglycemia (HCC) (Primary)  Assessment & Plan:  Diabetes is worsening.  A1c just above goal at 7.3%.  Continue current treatment regimen.  Continue weight loss efforts.  Diabetes will be reassessed in 3 months.    Orders:  -     POC Glycosylated Hemoglobin (Hb A1C)  -     POC Glucose, Blood    2. Type 2 diabetes mellitus with hypoglycemia without coma, with long-term current use of insulin (HCC)  Assessment & Plan:  Continue frequent FSBS.      3. Benign hypertension  Assessment & Plan:  Hypertension is unchanged.  Continue current treatment regimen.  Blood pressure will be reassessed at the next regular appointment.      4. Mixed hyperlipidemia  Assessment & Plan:  Continue statin.      5. Atherosclerosis of native coronary artery of native heart without angina pectoris  Assessment & Plan:  Continue ASA, statin and SGLT-2 inhibitor.      6. Insulin long-term use (HCC)    Other orders  -     Empagliflozin-metFORMIN HCl ER (Synjardy XR) 12.5-1000 MG tablet sustained-release 24 hour; Take 2 tablets by mouth Daily.  Dispense: 180 tablet; Refill: 3  -     glucose blood (OneTouch Verio) test strip; Use 4x per day; ICD-10 E11.65; Z79.4  Dispense: 400 each; Refill: 3  -     insulin detemir (Levemir FlexTouch) 100 UNIT/ML injection; Inject 40 Units under the skin into the appropriate area as directed 2 (Two) Times a Day.  Dispense: 75 mL; Refill: 3  -     Insulin Lispro, 1 Unit  Dial, (HumaLOG KwikPen) 100 UNIT/ML solution pen-injector; Administer 3 times a day as needed per sliding scale. Max dose of 44 units per day  Dispense: 45 mL; Refill: 1  -     Insulin Pen Needle (B-D UF III MINI PEN NEEDLES) 31G X 5 MM misc; USE AS DIRECTED 5 TIMES    DAILY  Dispense: 450 each; Refill: 1      Return in about 3 months (around 3/29/2022) for Recheck with A1c.    Dat Manning MD   12/29/2021

## 2022-03-02 ENCOUNTER — TELEPHONE (OUTPATIENT)
Dept: ENDOCRINOLOGY | Facility: CLINIC | Age: 67
End: 2022-03-02

## 2022-03-02 NOTE — TELEPHONE ENCOUNTER
Patients wife called wanting to know if it is safe for River to have the covid infusion treatments. Please advise.

## 2022-03-21 RX ORDER — EMPAGLIFLOZIN, METFORMIN HYDROCHLORIDE 12.5; 1 MG/1; MG/1
2 TABLET, EXTENDED RELEASE ORAL DAILY
Qty: 180 TABLET | Refills: 0 | Status: SHIPPED | OUTPATIENT
Start: 2022-03-21 | End: 2022-06-20 | Stop reason: SDUPTHER

## 2022-05-02 RX ORDER — FLURBIPROFEN SODIUM 0.3 MG/ML
SOLUTION/ DROPS OPHTHALMIC
Qty: 450 EACH | Refills: 3 | Status: SHIPPED | OUTPATIENT
Start: 2022-05-02

## 2022-05-24 ENCOUNTER — OFFICE VISIT (OUTPATIENT)
Dept: ENDOCRINOLOGY | Facility: CLINIC | Age: 67
End: 2022-05-24

## 2022-05-24 VITALS
DIASTOLIC BLOOD PRESSURE: 73 MMHG | WEIGHT: 212 LBS | HEIGHT: 72 IN | HEART RATE: 57 BPM | BODY MASS INDEX: 28.71 KG/M2 | SYSTOLIC BLOOD PRESSURE: 128 MMHG | OXYGEN SATURATION: 97 %

## 2022-05-24 DIAGNOSIS — Z79.4 TYPE 2 DIABETES MELLITUS WITH HYPOGLYCEMIA WITHOUT COMA, WITH LONG-TERM CURRENT USE OF INSULIN: ICD-10-CM

## 2022-05-24 DIAGNOSIS — E78.2 MIXED HYPERLIPIDEMIA: ICD-10-CM

## 2022-05-24 DIAGNOSIS — E11.649 TYPE 2 DIABETES MELLITUS WITH HYPOGLYCEMIA WITHOUT COMA, WITH LONG-TERM CURRENT USE OF INSULIN: ICD-10-CM

## 2022-05-24 DIAGNOSIS — E11.65 UNCONTROLLED TYPE 2 DIABETES MELLITUS WITH HYPERGLYCEMIA: Primary | ICD-10-CM

## 2022-05-24 DIAGNOSIS — Z79.4 INSULIN LONG-TERM USE: ICD-10-CM

## 2022-05-24 DIAGNOSIS — I10 BENIGN HYPERTENSION: ICD-10-CM

## 2022-05-24 DIAGNOSIS — I25.10 ATHEROSCLEROSIS OF NATIVE CORONARY ARTERY OF NATIVE HEART WITHOUT ANGINA PECTORIS: ICD-10-CM

## 2022-05-24 LAB
EXPIRATION DATE: NORMAL
EXPIRATION DATE: NORMAL
GLUCOSE BLDC GLUCOMTR-MCNC: 108 MG/DL (ref 70–130)
HBA1C MFR BLD: 7.4 %
Lab: NORMAL
Lab: NORMAL

## 2022-05-24 PROCEDURE — 99214 OFFICE O/P EST MOD 30 MIN: CPT | Performed by: INTERNAL MEDICINE

## 2022-05-24 PROCEDURE — 82947 ASSAY GLUCOSE BLOOD QUANT: CPT | Performed by: INTERNAL MEDICINE

## 2022-05-24 PROCEDURE — 83036 HEMOGLOBIN GLYCOSYLATED A1C: CPT | Performed by: INTERNAL MEDICINE

## 2022-05-24 RX ORDER — TAMSULOSIN HYDROCHLORIDE 0.4 MG/1
1 CAPSULE ORAL NIGHTLY
COMMUNITY
Start: 2022-05-08

## 2022-05-24 RX ORDER — ASCORBIC ACID 500 MG
500 TABLET ORAL DAILY
COMMUNITY
Start: 2022-03-01

## 2022-05-24 RX ORDER — ACETAMINOPHEN 160 MG
TABLET,DISINTEGRATING ORAL
COMMUNITY
Start: 2022-03-01

## 2022-05-24 NOTE — PROGRESS NOTES
"     Office Note      Date: 2022  Patient Name: River Ching  MRN: [id number]  : 1955    Chief Complaint   Patient presents with   • Diabetes       History of Present Illness:   River Ching is a 67 y.o. male who presents for Diabetes type 2. Diagnosed in: . Treated in past with oral agents. Current treatments: synjardy and basal bolus insulin. Number of insulin shots per day: 2. Checks blood sugar 4 times a day. Has low blood sugar: occasional. Aspirin use: Yes. Statin use: Yes. ACE-I/ARB use: Yes. Changes in health since last visit: UTI and COVID-19 breakthrough infection. Last eye exam .    Subjective      Diabetic Complications:  Eyes: No  Kidneys: No  Feet: No  Heart: Yes - stents    Diet and Exercise:  Meals per day: 3  Minutes of exercise per week: 0 mins.    Review of Systems:   Review of Systems   Constitutional: Negative.    Cardiovascular: Negative.    Gastrointestinal: Negative.    Endocrine: Negative.        The following portions of the patient's history were reviewed and updated as appropriate: allergies, current medications, past family history, past medical history, past social history, past surgical history and problem list.    Objective       Visit Vitals  /73   Pulse 57   Ht 182.9 cm (72\")   Wt 96.2 kg (212 lb)   SpO2 97%   BMI 28.75 kg/m²       Physical Exam:  Physical Exam  Constitutional:       Appearance: Normal appearance.   Neurological:      Mental Status: He is alert.         Labs:    HbA1c  Lab Results   Component Value Date    HGBA1C 7.4 2022       CMP  Lab Results   Component Value Date    GLUCOSE 140 (H) 2021    BUN 10 2021    CREATININE 0.89 2021    EGFRIFNONA 85 2021    BCR 11.2 2021    K 4.7 2021    CO2 27.9 2021    CALCIUM 9.8 2021    AST 18 2021    ALT 13 2021        Lipid Panel  Lab Results   Component Value Date    HDL 35 (L) 2021    LDL 66 2021    TRIG 112 2021    "     TSH  Lab Results   Component Value Date    TSH 0.892 08/17/2021        Hemoglobin A1C  Lab Results   Component Value Date    HGBA1C 7.4 05/24/2022        Microalbumin/Creatinine  Lab Results   Component Value Date    MALBCRERATIO  08/17/2021      Comment:      Unable to calculate    MICROALBUR <1.2 08/17/2021           Assessment / Plan      Assessment & Plan:  Diagnoses and all orders for this visit:    1. Uncontrolled type 2 diabetes mellitus with hyperglycemia (HCC) (Primary)  Assessment & Plan:  Diabetes is unchanged.  A1c just above goal at 7.4%.  Continue current treatment regimen.  Continue to work on diet/exercise/weight loss.  Diabetes will be reassessed in 3 months.      2. Type 2 diabetes mellitus with hypoglycemia without coma, with long-term current use of insulin (HCC)  Assessment & Plan:  Continue frequent FSBS.    Orders:  -     POC Glucose, Blood  -     POC Glycosylated Hemoglobin (Hb A1C)    3. Benign hypertension  Assessment & Plan:  Hypertension is unchanged.  Continue current treatment regimen.  Blood pressure will be reassessed at the next regular appointment.      4. Mixed hyperlipidemia  Assessment & Plan:  Continue statin.      5. Atherosclerosis of native coronary artery of native heart without angina pectoris  Assessment & Plan:  Continue ASA, statin and SGLT-2 inhibitor.      6. Insulin long-term use (HCC)      Return in about 3 months (around 8/24/2022) for Recheck with A1c, CMP, lipids, TSH, microalbumin, foot exam.    Dat Manning MD   05/24/2022

## 2022-05-24 NOTE — ASSESSMENT & PLAN NOTE
Diabetes is unchanged.  A1c just above goal at 7.4%.  Continue current treatment regimen.  Continue to work on diet/exercise/weight loss.  Diabetes will be reassessed in 3 months.

## 2022-05-24 NOTE — ASSESSMENT & PLAN NOTE
Continue ASA, statin and SGLT-2 inhibitor.   Discussed situation with wife.  

## 2022-06-20 RX ORDER — INSULIN LISPRO 100 [IU]/ML
INJECTION, SOLUTION INTRAVENOUS; SUBCUTANEOUS
Qty: 45 ML | Refills: 1 | Status: SHIPPED | OUTPATIENT
Start: 2022-06-20 | End: 2022-12-13 | Stop reason: SDUPTHER

## 2022-06-20 RX ORDER — EMPAGLIFLOZIN, METFORMIN HYDROCHLORIDE 12.5; 1 MG/1; MG/1
2 TABLET, EXTENDED RELEASE ORAL DAILY
Qty: 180 TABLET | Refills: 1 | Status: SHIPPED | OUTPATIENT
Start: 2022-06-20 | End: 2022-12-13 | Stop reason: SDUPTHER

## 2022-06-20 NOTE — TELEPHONE ENCOUNTER
Pt wife Susana called requesting a prescription for Humalog kwikpen 100 unit/mL solution. Also synjardy XR 12.5-1000 24 release 24 hour. Pt last seen 05/24/22 pt next f/u 10/26/22

## 2022-12-13 RX ORDER — INSULIN DETEMIR 100 [IU]/ML
40 INJECTION, SOLUTION SUBCUTANEOUS 2 TIMES DAILY
Qty: 75 ML | Refills: 3 | Status: SHIPPED | OUTPATIENT
Start: 2022-12-13

## 2022-12-13 RX ORDER — INSULIN LISPRO 100 [IU]/ML
INJECTION, SOLUTION INTRAVENOUS; SUBCUTANEOUS
Qty: 45 ML | Refills: 1 | Status: SHIPPED | OUTPATIENT
Start: 2022-12-13

## 2022-12-13 RX ORDER — EMPAGLIFLOZIN, METFORMIN HYDROCHLORIDE 12.5; 1 MG/1; MG/1
2 TABLET, EXTENDED RELEASE ORAL DAILY
Qty: 180 TABLET | Refills: 1 | Status: SHIPPED | OUTPATIENT
Start: 2022-12-13

## 2023-03-03 ENCOUNTER — OFFICE VISIT (OUTPATIENT)
Dept: ENDOCRINOLOGY | Facility: CLINIC | Age: 68
End: 2023-03-03
Payer: COMMERCIAL

## 2023-03-03 VITALS
HEIGHT: 72 IN | WEIGHT: 205 LBS | OXYGEN SATURATION: 97 % | DIASTOLIC BLOOD PRESSURE: 75 MMHG | HEART RATE: 82 BPM | BODY MASS INDEX: 27.77 KG/M2 | SYSTOLIC BLOOD PRESSURE: 118 MMHG

## 2023-03-03 DIAGNOSIS — I10 BENIGN HYPERTENSION: ICD-10-CM

## 2023-03-03 DIAGNOSIS — E78.2 MIXED HYPERLIPIDEMIA: ICD-10-CM

## 2023-03-03 DIAGNOSIS — E11.65 UNCONTROLLED TYPE 2 DIABETES MELLITUS WITH HYPERGLYCEMIA: Primary | ICD-10-CM

## 2023-03-03 DIAGNOSIS — E11.3293 TYPE 2 DIABETES MELLITUS WITH BOTH EYES AFFECTED BY MILD NONPROLIFERATIVE RETINOPATHY WITHOUT MACULAR EDEMA, WITH LONG-TERM CURRENT USE OF INSULIN: ICD-10-CM

## 2023-03-03 DIAGNOSIS — Z79.4 TYPE 2 DIABETES MELLITUS WITH HYPOGLYCEMIA WITHOUT COMA, WITH LONG-TERM CURRENT USE OF INSULIN: ICD-10-CM

## 2023-03-03 DIAGNOSIS — Z79.4 TYPE 2 DIABETES MELLITUS WITH BOTH EYES AFFECTED BY MILD NONPROLIFERATIVE RETINOPATHY WITHOUT MACULAR EDEMA, WITH LONG-TERM CURRENT USE OF INSULIN: ICD-10-CM

## 2023-03-03 DIAGNOSIS — I25.10 ATHEROSCLEROSIS OF NATIVE CORONARY ARTERY OF NATIVE HEART WITHOUT ANGINA PECTORIS: ICD-10-CM

## 2023-03-03 DIAGNOSIS — Z79.4 INSULIN LONG-TERM USE: ICD-10-CM

## 2023-03-03 DIAGNOSIS — E11.649 TYPE 2 DIABETES MELLITUS WITH HYPOGLYCEMIA WITHOUT COMA, WITH LONG-TERM CURRENT USE OF INSULIN: ICD-10-CM

## 2023-03-03 LAB
ALBUMIN SERPL-MCNC: 4.1 G/DL (ref 3.5–5.2)
ALBUMIN UR-MCNC: 3 MG/DL
ALBUMIN/GLOB SERPL: 1.3 G/DL
ALP SERPL-CCNC: 83 U/L (ref 39–117)
ALT SERPL W P-5'-P-CCNC: 9 U/L (ref 1–41)
ANION GAP SERPL CALCULATED.3IONS-SCNC: 10.4 MMOL/L (ref 5–15)
AST SERPL-CCNC: 14 U/L (ref 1–40)
BILIRUB SERPL-MCNC: 1.2 MG/DL (ref 0–1.2)
BUN SERPL-MCNC: 14 MG/DL (ref 8–23)
BUN/CREAT SERPL: 20 (ref 7–25)
CALCIUM SPEC-SCNC: 9.2 MG/DL (ref 8.6–10.5)
CHLORIDE SERPL-SCNC: 106 MMOL/L (ref 98–107)
CHOLEST SERPL-MCNC: 118 MG/DL (ref 0–200)
CO2 SERPL-SCNC: 28.6 MMOL/L (ref 22–29)
CREAT SERPL-MCNC: 0.7 MG/DL (ref 0.76–1.27)
CREAT UR-MCNC: 62.8 MG/DL
EGFRCR SERPLBLD CKD-EPI 2021: 99.7 ML/MIN/1.73
EXPIRATION DATE: NORMAL
EXPIRATION DATE: NORMAL
GLOBULIN UR ELPH-MCNC: 3.1 GM/DL
GLUCOSE BLDC GLUCOMTR-MCNC: 105 MG/DL (ref 70–130)
GLUCOSE SERPL-MCNC: 91 MG/DL (ref 65–99)
HBA1C MFR BLD: 7.7 %
HDLC SERPL-MCNC: 41 MG/DL (ref 40–60)
LDLC SERPL CALC-MCNC: 62 MG/DL (ref 0–100)
LDLC/HDLC SERPL: 1.51 {RATIO}
Lab: NORMAL
Lab: NORMAL
MICROALBUMIN/CREAT UR: 47.8 MG/G
POTASSIUM SERPL-SCNC: 4.5 MMOL/L (ref 3.5–5.2)
PROT SERPL-MCNC: 7.2 G/DL (ref 6–8.5)
SODIUM SERPL-SCNC: 145 MMOL/L (ref 136–145)
TRIGL SERPL-MCNC: 76 MG/DL (ref 0–150)
TSH SERPL DL<=0.05 MIU/L-ACNC: 1.03 UIU/ML (ref 0.27–4.2)
VLDLC SERPL-MCNC: 15 MG/DL (ref 5–40)

## 2023-03-03 PROCEDURE — 99214 OFFICE O/P EST MOD 30 MIN: CPT | Performed by: INTERNAL MEDICINE

## 2023-03-03 PROCEDURE — 83036 HEMOGLOBIN GLYCOSYLATED A1C: CPT | Performed by: INTERNAL MEDICINE

## 2023-03-03 PROCEDURE — 82570 ASSAY OF URINE CREATININE: CPT | Performed by: INTERNAL MEDICINE

## 2023-03-03 PROCEDURE — 84443 ASSAY THYROID STIM HORMONE: CPT | Performed by: INTERNAL MEDICINE

## 2023-03-03 PROCEDURE — 80061 LIPID PANEL: CPT | Performed by: INTERNAL MEDICINE

## 2023-03-03 PROCEDURE — 80053 COMPREHEN METABOLIC PANEL: CPT | Performed by: INTERNAL MEDICINE

## 2023-03-03 PROCEDURE — 82043 UR ALBUMIN QUANTITATIVE: CPT | Performed by: INTERNAL MEDICINE

## 2023-03-03 PROCEDURE — 82947 ASSAY GLUCOSE BLOOD QUANT: CPT | Performed by: INTERNAL MEDICINE

## 2023-03-03 RX ORDER — BLOOD-GLUCOSE SENSOR
1 EACH MISCELLANEOUS
Qty: 6 EACH | Refills: 3 | Status: SHIPPED | OUTPATIENT
Start: 2023-03-03

## 2023-03-03 NOTE — PROGRESS NOTES
"     Office Note      Date: 2023  Patient Name: River Ching  MRN: [id number]  : 1955    Chief Complaint   Patient presents with   • Diabetes       History of Present Illness:   River Ching is a 68 y.o. male who presents for Diabetes type 2. Diagnosed in: . Treated in past with oral agents. Current treatments: synjardy and basal bolus insulin. Number of insulin shots per day: 3-4. Checks blood sugar 4 times a day. He is varying his insulin dose based on glucose readings.  Has low blood sugar: occasional. Aspirin use: Yes. Statin use: Yes. ACE-I/ARB use: Yes. Changes in health since last visit: none. Last eye exam 2022.    Subjective      Diabetic Complications:  Eyes: Yes - NPDR  Kidneys: No  Feet: No  Heart: Yes - stents    Diet and Exercise:  Meals per day: 3  Minutes of exercise per week: 0 mins.    Review of Systems:   Review of Systems   Constitutional: Negative.    Cardiovascular: Negative.    Gastrointestinal: Negative.    Endocrine: Negative.        The following portions of the patient's history were reviewed and updated as appropriate: allergies, current medications, past family history, past medical history, past social history, past surgical history and problem list.    Objective       Visit Vitals  /75   Pulse 82   Ht 182.9 cm (72\")   Wt 93 kg (205 lb)   SpO2 97%   BMI 27.80 kg/m²       Physical Exam:  Physical Exam  Constitutional:       Appearance: Normal appearance.   Cardiovascular:      Pulses:           Dorsalis pedis pulses are 2+ on the right side and 2+ on the left side.        Posterior tibial pulses are 2+ on the right side and 2+ on the left side.   Feet:      Right foot:      Protective Sensation: 5 sites tested. 5 sites sensed.      Skin integrity: Skin integrity normal.      Toenail Condition: Right toenails are abnormally thick. Fungal disease present.     Left foot:      Protective Sensation: 5 sites tested. 5 sites sensed.      Skin integrity: Skin integrity " normal.      Toenail Condition: Left toenails are abnormally thick. Fungal disease present.  Neurological:      Mental Status: He is alert.         Labs:    HbA1c  Lab Results   Component Value Date    HGBA1C 7.7 03/03/2023       CMP  Lab Results   Component Value Date    GLUCOSE 91 03/03/2023    BUN 14 03/03/2023    CREATININE 0.70 (L) 03/03/2023    EGFRIFNONA 85 08/17/2021    BCR 20.0 03/03/2023    K 4.5 03/03/2023    CO2 28.6 03/03/2023    CALCIUM 9.2 03/03/2023    AST 14 03/03/2023    ALT 9 03/03/2023        Lipid Panel  Lab Results   Component Value Date    HDL 41 03/03/2023    LDL 62 03/03/2023    TRIG 76 03/03/2023        TSH  Lab Results   Component Value Date    TSH 1.030 03/03/2023        Hemoglobin A1C  Lab Results   Component Value Date    HGBA1C 7.7 03/03/2023        Microalbumin/Creatinine  Lab Results   Component Value Date    MALBCRERATIO 47.8 03/03/2023    MICROALBUR 3.0 03/03/2023           Assessment / Plan      Assessment & Plan:  Diagnoses and all orders for this visit:    1. Uncontrolled type 2 diabetes mellitus with hyperglycemia (HCC) (Primary)  Assessment & Plan:  Diabetes is worsening.  A1c above goal.    Continue current treatment regimen.  Diabetes will be reassessed in 3 months.    Orders:  -     POC Glucose, Blood  -     POC Glycosylated Hemoglobin (Hb A1C)  -     Comprehensive Metabolic Panel; Future  -     Lipid Panel; Future  -     Microalbumin / Creatinine Urine Ratio - Urine, Clean Catch; Future  -     TSH; Future  -     Comprehensive Metabolic Panel  -     Lipid Panel  -     Microalbumin / Creatinine Urine Ratio - Urine, Clean Catch  -     TSH    2. Type 2 diabetes mellitus with hypoglycemia without coma, with long-term current use of insulin (HCC)  Assessment & Plan:  He notes some hypoglycemia after taking rapid acting insulin.  He is taking less to avoid lows.  We discussed CGM again today.  Will send Rx for Samantha 3 to see if this is covered.      3. Benign  hypertension  Assessment & Plan:  Hypertension is unchanged.  Continue current treatment regimen.  Blood pressure will be reassessed at the next regular appointment.      4. Mixed hyperlipidemia  Assessment & Plan:  Continue statin.  Check lipids today.      5. Atherosclerosis of native coronary artery of native heart without angina pectoris  Assessment & Plan:  Continue ASA and statin.  Continue cardiology follow up.      6. Insulin long-term use (HCC)    7. Type 2 diabetes mellitus with both eyes affected by mild nonproliferative retinopathy without macular edema, with long-term current use of insulin (HCC)  Assessment & Plan:  Continue ophthalmology follow up.      Other orders  -     Continuous Blood Gluc Sensor (FreeStyle Samantha 3 Sensor) misc; 1 each Every 14 (Fourteen) Days.  Dispense: 6 each; Refill: 3    Current Outpatient Medications   Medication Instructions   • ascorbic acid (VITAMIN C) 500 mg, Oral, Daily   • aspirin 325 MG tablet aspirin 325 mg tablet,delayed release   • atorvastatin (LIPITOR) 40 MG tablet daily   • B-D UF III MINI PEN NEEDLES 31G X 5 MM misc USE AS DIRECTED 5 TIMES    DAILY   • Cholecalciferol (Vitamin D3) 50 MCG (2000 UT) capsule TAKE 1 CAPSULES BY MOUTH EVERY DAY   • Continuous Blood Gluc Sensor (FreeStyle Samantha 3 Sensor) misc 1 each, Does not apply, Every 14 Days   • Empagliflozin-metFORMIN HCl ER (Synjardy XR) 12.5-1000 MG tablet sustained-release 24 hour 2 tablets, Oral, Daily   • furosemide (LASIX) 20 MG tablet No dose, route, or frequency recorded.   • glucose blood (OneTouch Verio) test strip Use 4x per day; ICD-10 E11.65; Z79.4   • Insulin Lispro, 1 Unit Dial, (HumaLOG KwikPen) 100 UNIT/ML solution pen-injector Administer 3 times a day as needed per sliding scale. Max dose of 44 units per day   • Levemir FlexTouch 40 Units, Subcutaneous, 2 Times Daily   • losartan-hydrochlorothiazide (HYZAAR) 50-12.5 MG per tablet daily   • metoprolol succinate XL (TOPROL-XL) 100 MG 24 hr  tablet No dose, route, or frequency recorded.   • tamsulosin (FLOMAX) 0.4 MG capsule 24 hr capsule 1 capsule, Oral, Nightly      Return in about 3 months (around 6/3/2023) for Recheck with A1c, microalbumin.    Dat Manning MD   03/03/2023

## 2023-03-03 NOTE — ASSESSMENT & PLAN NOTE
He notes some hypoglycemia after taking rapid acting insulin.  He is taking less to avoid lows.  We discussed CGM again today.  Will send Rx for Samantha 3 to see if this is covered.

## 2023-03-03 NOTE — ASSESSMENT & PLAN NOTE
Diabetes is worsening.  A1c above goal.    Continue current treatment regimen.  Diabetes will be reassessed in 3 months.

## 2023-03-06 ENCOUNTER — PRIOR AUTHORIZATION (OUTPATIENT)
Dept: ENDOCRINOLOGY | Facility: CLINIC | Age: 68
End: 2023-03-06
Payer: COMMERCIAL

## 2023-03-06 NOTE — TELEPHONE ENCOUNTER
GARNEY WHITE Key: [id number] - PA Case ID: [id number] - Rx #: [id number] help? Call us at [phone]  Outcome  Approvedon March 3  PA Case: [id number], Status: Approved, Coverage Starts on: 3/3/2023 12:00:00 AM, Coverage Ends on: 3/2/2024 12:00:00 AM.  Drug  FreeStyle Samantha 3 Sensor  Form  [employer] PA Form (2017 [employer])

## 2023-04-20 RX ORDER — FLURBIPROFEN SODIUM 0.3 MG/ML
SOLUTION/ DROPS OPHTHALMIC
Qty: 450 EACH | Refills: 3 | Status: SHIPPED | OUTPATIENT
Start: 2023-04-20

## 2023-08-28 RX ORDER — EMPAGLIFLOZIN, METFORMIN HYDROCHLORIDE 12.5; 1 MG/1; MG/1
2 TABLET, EXTENDED RELEASE ORAL DAILY
Qty: 180 TABLET | Refills: 3 | Status: SHIPPED | OUTPATIENT
Start: 2023-08-28

## 2023-08-28 NOTE — TELEPHONE ENCOUNTER
Rx Refill Note  Requested Prescriptions      No prescriptions requested or ordered in this encounter        Last office visit with prescribing clinician: 7/10/2023      Next office visit with prescribing clinician: 12/12/2023       Radha Currie (Jodi)  08/28/23, 11:49 EDT

## 2023-12-12 ENCOUNTER — OFFICE VISIT (OUTPATIENT)
Dept: ENDOCRINOLOGY | Facility: CLINIC | Age: 68
End: 2023-12-12
Payer: COMMERCIAL

## 2023-12-12 VITALS
DIASTOLIC BLOOD PRESSURE: 79 MMHG | HEART RATE: 73 BPM | OXYGEN SATURATION: 98 % | HEIGHT: 72 IN | SYSTOLIC BLOOD PRESSURE: 120 MMHG | BODY MASS INDEX: 26.68 KG/M2 | WEIGHT: 197 LBS

## 2023-12-12 DIAGNOSIS — Z79.4 TYPE 2 DIABETES MELLITUS WITH BOTH EYES AFFECTED BY MILD NONPROLIFERATIVE RETINOPATHY WITHOUT MACULAR EDEMA, WITH LONG-TERM CURRENT USE OF INSULIN: ICD-10-CM

## 2023-12-12 DIAGNOSIS — E11.3293 TYPE 2 DIABETES MELLITUS WITH BOTH EYES AFFECTED BY MILD NONPROLIFERATIVE RETINOPATHY WITHOUT MACULAR EDEMA, WITH LONG-TERM CURRENT USE OF INSULIN: ICD-10-CM

## 2023-12-12 DIAGNOSIS — I10 BENIGN HYPERTENSION: ICD-10-CM

## 2023-12-12 DIAGNOSIS — I25.10 ATHEROSCLEROSIS OF NATIVE CORONARY ARTERY OF NATIVE HEART WITHOUT ANGINA PECTORIS: ICD-10-CM

## 2023-12-12 DIAGNOSIS — Z79.4 TYPE 2 DIABETES MELLITUS WITH HYPOGLYCEMIA WITHOUT COMA, WITH LONG-TERM CURRENT USE OF INSULIN: ICD-10-CM

## 2023-12-12 DIAGNOSIS — E11.65 UNCONTROLLED TYPE 2 DIABETES MELLITUS WITH HYPERGLYCEMIA: Primary | ICD-10-CM

## 2023-12-12 DIAGNOSIS — E78.2 MIXED HYPERLIPIDEMIA: ICD-10-CM

## 2023-12-12 DIAGNOSIS — E11.649 TYPE 2 DIABETES MELLITUS WITH HYPOGLYCEMIA WITHOUT COMA, WITH LONG-TERM CURRENT USE OF INSULIN: ICD-10-CM

## 2023-12-12 LAB
EXPIRATION DATE: ABNORMAL
EXPIRATION DATE: NORMAL
GLUCOSE BLDC GLUCOMTR-MCNC: 107 MG/DL (ref 70–130)
HBA1C MFR BLD: 6.6 % (ref 4.5–5.7)
Lab: ABNORMAL
Lab: NORMAL

## 2023-12-12 NOTE — PROGRESS NOTES
"     Office Note      Date: 2023  Patient Name: River Ching  MRN: [id number]  : 1955    Chief Complaint   Patient presents with    Diabetes       History of Present Illness:   River Ching is a 68 y.o. male who presents for Diabetes type 2. Diagnosed in: . Treated in past with oral agents. Current treatments: synjardy and basal bolus insulin. Number of insulin shots per day: 3-4. Checks blood sugar 288 times a day - on FreeStyle Samantha 3. He is varying his insulin dose based on glucose readings.  Has low blood sugar: occasional. Aspirin use: Yes. Statin use: Yes. ACE-I/ARB use: Yes. Changes in health since last visit: none. Last eye exam 2022     Subjective      Diabetic Complications:  Eyes: Yes - NPDR  Kidneys: No  Feet: No  Heart: Yes - stents    Diet and Exercise:  Meals per day: 3  Minutes of exercise per week: 0 mins.    Review of Systems:   Review of Systems   Constitutional: Negative.    Cardiovascular: Negative.    Gastrointestinal: Negative.    Endocrine: Negative.        The following portions of the patient's history were reviewed and updated as appropriate: allergies, current medications, past family history, past medical history, past social history, past surgical history, and problem list.    Objective     Visit Vitals  /79   Pulse 73   Ht 182.9 cm (72\")   Wt 89.4 kg (197 lb)   SpO2 98%   BMI 26.72 kg/m²       Physical Exam:  Physical Exam  Constitutional:       Appearance: Normal appearance.   Neurological:      Mental Status: He is alert.         Labs:    HbA1c  Lab Results   Component Value Date    HGBA1C 6.6 (A) 2023       CMP  Lab Results   Component Value Date    GLUCOSE 91 2023    BUN 14 2023    CREATININE 0.70 (L) 2023    EGFRIFNONA 85 2021    BCR 20.0 2023    K 4.5 2023    CO2 28.6 2023    CALCIUM 9.2 2023    AST 14 2023    ALT 9 2023        Lipid Panel  Lab Results   Component Value Date    HDL 41 " 03/03/2023    LDL 62 03/03/2023    TRIG 76 03/03/2023        TSH  Lab Results   Component Value Date    TSH 1.030 03/03/2023        Hemoglobin A1C  Lab Results   Component Value Date    HGBA1C 6.6 (A) 12/12/2023        Microalbumin/Creatinine  Lab Results   Component Value Date    LESLIE  07/10/2023      Comment:      Unable to calculate    MICROALBUR <1.2 07/10/2023           Assessment / Plan      Assessment & Plan:  Diagnoses and all orders for this visit:    1. Uncontrolled type 2 diabetes mellitus with hyperglycemia (Primary)  Assessment & Plan:  Diabetes is improving with treatment.   Continue current treatment regimen.  Diabetes will be reassessed in 3 months.      2. Type 2 diabetes mellitus with hypoglycemia without coma, with long-term current use of insulin  Assessment & Plan:  Continue CGM.    Orders:  -     POC Glucose, Blood  -     POC Glycosylated Hemoglobin (Hb A1C)    3. Benign hypertension  Assessment & Plan:  Hypertension is unchanged.  Continue current treatment regimen.  Blood pressure will be reassessed at the next regular appointment.      4. Mixed hyperlipidemia  Assessment & Plan:  Continue statin.      5. Atherosclerosis of native coronary artery of native heart without angina pectoris  Assessment & Plan:  Continue ASA, statin and SGLT-2 inhibitor.      6. Type 2 diabetes mellitus with both eyes affected by mild nonproliferative retinopathy without macular edema, with long-term current use of insulin  Assessment & Plan:  Continue ophthalmology follow up.        Current Outpatient Medications   Medication Instructions    ascorbic acid (VITAMIN C) 500 mg, Oral, Daily    aspirin 325 MG tablet aspirin 325 mg tablet,delayed release    atorvastatin (LIPITOR) 40 MG tablet daily    B-D UF III MINI PEN NEEDLES 31G X 5 MM misc USE AS DIRECTED 5 TIMES    DAILY    Cholecalciferol (Vitamin D3) 50 MCG (2000 UT) capsule TAKE 1 CAPSULES BY MOUTH EVERY DAY    Continuous Blood Gluc Sensor (FreeStyle  Samantha 3 Sensor) misc 1 each, Does not apply, Every 14 Days    Empagliflozin-metFORMIN HCl ER (Synjardy XR) 12.5-1000 MG tablet sustained-release 24 hour 2 tablets, Oral, Daily    furosemide (LASIX) 20 MG tablet No dose, route, or frequency recorded.    glucose blood (OneTouch Verio) test strip Use 4x per day; ICD-10 E11.65; Z79.4    Insulin Lispro, 1 Unit Dial, (HumaLOG KwikPen) 100 UNIT/ML solution pen-injector Administer 3 times a day as needed per sliding scale. Max dose of 44 units per day    Levemir FlexTouch 40 Units, Subcutaneous, 2 Times Daily    losartan-hydrochlorothiazide (HYZAAR) 50-12.5 MG per tablet daily    metoprolol succinate XL (TOPROL-XL) 100 MG 24 hr tablet No dose, route, or frequency recorded.    tamsulosin (FLOMAX) 0.4 MG capsule 24 hr capsule 1 capsule, Oral, Nightly      Return in about 3 months (around 3/12/2024) for Recheck with A1c, CMP, lipid, TSH, microalbumin, foot exam.    Dat Manning MD   12/12/2023

## 2024-01-09 RX ORDER — BLOOD-GLUCOSE SENSOR
1 EACH MISCELLANEOUS
Qty: 6 EACH | Refills: 3 | Status: SHIPPED | OUTPATIENT
Start: 2024-01-09

## 2024-01-09 NOTE — TELEPHONE ENCOUNTER
Rx Refill Note  Requested Prescriptions     Pending Prescriptions Disp Refills    Continuous Blood Gluc Sensor (FreeStyle Samantha 3 Sensor) misc 6 each 3     Sig: Use 1 each Every 14 (Fourteen) Days.    Dx Code:  E11.65  Last office visit with prescribing clinician: 12/12/2023   Last telemedicine visit with prescribing clinician: Visit date not found   Next office visit with prescribing clinician: 4/29/2024                         Would you like a call back once the refill request has been completed: [] Yes [] No    If the office needs to give you a call back, can they leave a voicemail: [] Yes [] No    Vijaya Gandara MA  01/09/24, 13:16 EST

## 2024-04-02 RX ORDER — FLURBIPROFEN SODIUM 0.3 MG/ML
SOLUTION/ DROPS OPHTHALMIC
Qty: 450 EACH | Refills: 0 | Status: SHIPPED | OUTPATIENT
Start: 2024-04-02

## 2024-04-02 NOTE — TELEPHONE ENCOUNTER
Rx Refill Note  Requested Prescriptions     Pending Prescriptions Disp Refills    B-D UF III MINI PEN NEEDLES 31G X 5 MM misc [Pharmacy Med Name: BD PEN NEEDLE/MINI/31G X5MM (3/16IN)] 450 each 0     Sig: USE AS DIRECTED 5 TIMES    DAILY          Last office visit with prescribing clinician: 12/12/2023     Next office visit with prescribing clinician: 4/29/2024         Tamra Atwood MA  04/02/24, 13:16 EDT

## 2024-04-29 ENCOUNTER — OFFICE VISIT (OUTPATIENT)
Dept: ENDOCRINOLOGY | Facility: CLINIC | Age: 69
End: 2024-04-29
Payer: COMMERCIAL

## 2024-04-29 VITALS
HEIGHT: 72 IN | TEMPERATURE: 96.8 F | HEART RATE: 47 BPM | BODY MASS INDEX: 26.71 KG/M2 | SYSTOLIC BLOOD PRESSURE: 132 MMHG | OXYGEN SATURATION: 98 % | WEIGHT: 197.2 LBS | DIASTOLIC BLOOD PRESSURE: 70 MMHG

## 2024-04-29 DIAGNOSIS — Z79.4 TYPE 2 DIABETES MELLITUS WITH HYPOGLYCEMIA WITHOUT COMA, WITH LONG-TERM CURRENT USE OF INSULIN: ICD-10-CM

## 2024-04-29 DIAGNOSIS — E11.3293 TYPE 2 DIABETES MELLITUS WITH BOTH EYES AFFECTED BY MILD NONPROLIFERATIVE RETINOPATHY WITHOUT MACULAR EDEMA, WITH LONG-TERM CURRENT USE OF INSULIN: ICD-10-CM

## 2024-04-29 DIAGNOSIS — E11.65 TYPE 2 DIABETES MELLITUS WITH HYPERGLYCEMIA, WITH LONG-TERM CURRENT USE OF INSULIN: Primary | ICD-10-CM

## 2024-04-29 DIAGNOSIS — Z79.4 TYPE 2 DIABETES MELLITUS WITH BOTH EYES AFFECTED BY MILD NONPROLIFERATIVE RETINOPATHY WITHOUT MACULAR EDEMA, WITH LONG-TERM CURRENT USE OF INSULIN: ICD-10-CM

## 2024-04-29 DIAGNOSIS — I25.10 ATHEROSCLEROSIS OF NATIVE CORONARY ARTERY OF NATIVE HEART WITHOUT ANGINA PECTORIS: ICD-10-CM

## 2024-04-29 DIAGNOSIS — I10 BENIGN HYPERTENSION: ICD-10-CM

## 2024-04-29 DIAGNOSIS — Z79.4 TYPE 2 DIABETES MELLITUS WITH HYPERGLYCEMIA, WITH LONG-TERM CURRENT USE OF INSULIN: Primary | ICD-10-CM

## 2024-04-29 DIAGNOSIS — E11.649 TYPE 2 DIABETES MELLITUS WITH HYPOGLYCEMIA WITHOUT COMA, WITH LONG-TERM CURRENT USE OF INSULIN: ICD-10-CM

## 2024-04-29 LAB
ALBUMIN SERPL-MCNC: 4.2 G/DL (ref 3.5–5.2)
ALBUMIN UR-MCNC: <1.2 MG/DL
ALBUMIN/GLOB SERPL: 1.6 G/DL
ALP SERPL-CCNC: 87 U/L (ref 39–117)
ALT SERPL W P-5'-P-CCNC: 9 U/L (ref 1–41)
ANION GAP SERPL CALCULATED.3IONS-SCNC: 10.7 MMOL/L (ref 5–15)
AST SERPL-CCNC: 17 U/L (ref 1–40)
BILIRUB SERPL-MCNC: 1.3 MG/DL (ref 0–1.2)
BUN SERPL-MCNC: 12 MG/DL (ref 8–23)
BUN/CREAT SERPL: 18.8 (ref 7–25)
CALCIUM SPEC-SCNC: 9 MG/DL (ref 8.6–10.5)
CHLORIDE SERPL-SCNC: 104 MMOL/L (ref 98–107)
CHOLEST SERPL-MCNC: 105 MG/DL (ref 0–200)
CO2 SERPL-SCNC: 27.3 MMOL/L (ref 22–29)
CREAT SERPL-MCNC: 0.64 MG/DL (ref 0.76–1.27)
CREAT UR-MCNC: 59.7 MG/DL
EGFRCR SERPLBLD CKD-EPI 2021: 101.8 ML/MIN/1.73
EXPIRATION DATE: ABNORMAL
EXPIRATION DATE: NORMAL
GLOBULIN UR ELPH-MCNC: 2.6 GM/DL
GLUCOSE BLDC GLUCOMTR-MCNC: 118 MG/DL (ref 70–130)
GLUCOSE SERPL-MCNC: 114 MG/DL (ref 65–99)
HBA1C MFR BLD: 6.7 % (ref 4.5–5.7)
HDLC SERPL-MCNC: 36 MG/DL (ref 40–60)
LDLC SERPL CALC-MCNC: 54 MG/DL (ref 0–100)
LDLC/HDLC SERPL: 1.53 {RATIO}
Lab: ABNORMAL
Lab: NORMAL
MICROALBUMIN/CREAT UR: NORMAL MG/G{CREAT}
POTASSIUM SERPL-SCNC: 4 MMOL/L (ref 3.5–5.2)
PROT SERPL-MCNC: 6.8 G/DL (ref 6–8.5)
SODIUM SERPL-SCNC: 142 MMOL/L (ref 136–145)
TRIGL SERPL-MCNC: 70 MG/DL (ref 0–150)
TSH SERPL DL<=0.05 MIU/L-ACNC: 1.12 UIU/ML (ref 0.27–4.2)
VLDLC SERPL-MCNC: 15 MG/DL (ref 5–40)

## 2024-04-29 PROCEDURE — 83036 HEMOGLOBIN GLYCOSYLATED A1C: CPT | Performed by: INTERNAL MEDICINE

## 2024-04-29 PROCEDURE — 84443 ASSAY THYROID STIM HORMONE: CPT | Performed by: INTERNAL MEDICINE

## 2024-04-29 PROCEDURE — 80053 COMPREHEN METABOLIC PANEL: CPT | Performed by: INTERNAL MEDICINE

## 2024-04-29 PROCEDURE — 99214 OFFICE O/P EST MOD 30 MIN: CPT | Performed by: INTERNAL MEDICINE

## 2024-04-29 PROCEDURE — 82947 ASSAY GLUCOSE BLOOD QUANT: CPT | Performed by: INTERNAL MEDICINE

## 2024-04-29 PROCEDURE — 82570 ASSAY OF URINE CREATININE: CPT | Performed by: INTERNAL MEDICINE

## 2024-04-29 PROCEDURE — 82043 UR ALBUMIN QUANTITATIVE: CPT | Performed by: INTERNAL MEDICINE

## 2024-04-29 PROCEDURE — 80061 LIPID PANEL: CPT | Performed by: INTERNAL MEDICINE

## 2024-04-29 RX ORDER — LOSARTAN POTASSIUM 50 MG/1
TABLET ORAL
COMMUNITY
Start: 2024-03-29

## 2024-04-29 NOTE — PROGRESS NOTES
"     Office Note      Date: 2024  Patient Name: River Ching  MRN: [id number]  : 1955    Chief Complaint   Patient presents with    Diabetes       History of Present Illness:   River Ching is a 69 y.o. male who presents for Diabetes type 2. Diagnosed in: . Treated in past with oral agents. Current treatments: synjardy and basal bolus insulin. Number of insulin shots per day: 3-4. Checks blood sugar 288 times a day - on FreeStyle Samantha 3. He is varying his insulin dose based on glucose readings.  Has low blood sugar: occasional. Aspirin use: Yes. Statin use: Yes. ACE-I/ARB use: Yes. Changes in health since last visit: none. Last eye exam 2023     Subjective      Diabetic Complications:  Eyes: Yes - NPDR  Kidneys: No  Feet: No  Heart: Yes - stents    Diet and Exercise:  Meals per day: 3  Minutes of exercise per week: 0 mins.    Review of Systems:   Review of Systems   Constitutional: Negative.    Cardiovascular: Negative.    Gastrointestinal: Negative.    Endocrine: Negative.        The following portions of the patient's history were reviewed and updated as appropriate: allergies, current medications, past family history, past medical history, past social history, past surgical history, and problem list.    Objective     Visit Vitals  /70   Pulse (!) 47   Temp 96.8 °F (36 °C)   Ht 182.9 cm (72\")   Wt 89.4 kg (197 lb 3.2 oz)   SpO2 98%   BMI 26.75 kg/m²       Physical Exam:  Physical Exam  Constitutional:       Appearance: Normal appearance.   Cardiovascular:      Pulses:           Dorsalis pedis pulses are 2+ on the right side and 2+ on the left side.        Posterior tibial pulses are 2+ on the right side and 2+ on the left side.   Feet:      Right foot:      Protective Sensation: 5 sites tested.  5 sites sensed.      Skin integrity: Dry skin present.      Toenail Condition: Right toenails are abnormally thick.      Left foot:      Protective Sensation: 5 sites tested.  5 sites sensed.    "   Skin integrity: Dry skin present.      Toenail Condition: Left toenails are abnormally thick. Fungal disease present.  Neurological:      Mental Status: He is alert.         Labs:    HbA1c  Lab Results   Component Value Date    HGBA1C 6.7 (A) 04/29/2024       CMP  Lab Results   Component Value Date    GLUCOSE 91 03/03/2023    BUN 14 03/03/2023    CREATININE 0.70 (L) 03/03/2023    EGFRIFNONA 85 08/17/2021    BCR 20.0 03/03/2023    K 4.5 03/03/2023    CO2 28.6 03/03/2023    CALCIUM 9.2 03/03/2023    AST 14 03/03/2023    ALT 9 03/03/2023        Lipid Panel  Lab Results   Component Value Date    HDL 41 03/03/2023    LDL 62 03/03/2023    TRIG 76 03/03/2023        TSH  Lab Results   Component Value Date    TSH 1.030 03/03/2023        Hemoglobin A1C  Lab Results   Component Value Date    HGBA1C 6.7 (A) 04/29/2024        Microalbumin/Creatinine  Lab Results   Component Value Date    MALBCRERATIO  07/10/2023      Comment:      Unable to calculate    MICROALBUR <1.2 07/10/2023           Assessment / Plan      Assessment & Plan:  Diagnoses and all orders for this visit:    1. Type 2 diabetes mellitus with hyperglycemia, with long-term current use of insulin (Primary)  Assessment & Plan:  Diabetes is stable.   Continue current treatment regimen.  Diabetes will be reassessed in 6 months.    Orders:  -     POC Glycosylated Hemoglobin (Hb A1C)  -     POC Glucose, Blood  -     Comprehensive Metabolic Panel; Future  -     Lipid Panel; Future  -     Microalbumin / Creatinine Urine Ratio - Urine, Clean Catch; Future  -     TSH; Future    2. Type 2 diabetes mellitus with hypoglycemia without coma, with long-term current use of insulin  Assessment & Plan:  Continue CGM.      3. Benign hypertension  Assessment & Plan:  Hypertension is stable and controlled  Continue current treatment regimen.  Blood pressure will be reassessed in 6 months.      4. Atherosclerosis of native coronary artery of native heart without angina  pectoris  Assessment & Plan:  Continue ASA, statin and SGLT-2 inhibitor.      5. Type 2 diabetes mellitus with both eyes affected by mild nonproliferative retinopathy without macular edema, with long-term current use of insulin  Assessment & Plan:  Continue ophthalmology follow up.        Current Outpatient Medications   Medication Instructions    aspirin 325 MG tablet aspirin 325 mg tablet,delayed release    atorvastatin (LIPITOR) 40 MG tablet daily    B-D UF III MINI PEN NEEDLES 31G X 5 MM misc USE AS DIRECTED 5 TIMES    DAILY    Continuous Blood Gluc Sensor (FreeStyle Samantha 3 Sensor) misc 1 each, Does not apply, Every 14 Days    Empagliflozin-metFORMIN HCl ER (Synjardy XR) 12.5-1000 MG tablet sustained-release 24 hour 2 tablets, Oral, Daily    furosemide (LASIX) 20 MG tablet No dose, route, or frequency recorded.    glucose blood (OneTouch Verio) test strip Use 4x per day; ICD-10 E11.65; Z79.4    Insulin Lispro, 1 Unit Dial, (HumaLOG KwikPen) 100 UNIT/ML solution pen-injector Administer 3 times a day as needed per sliding scale. Max dose of 44 units per day    Levemir FlexTouch 40 Units, Subcutaneous, 2 Times Daily    losartan (COZAAR) 50 MG tablet     losartan-hydrochlorothiazide (HYZAAR) 50-12.5 MG per tablet daily    metoprolol succinate XL (TOPROL-XL) 100 MG 24 hr tablet No dose, route, or frequency recorded.      Return in about 6 months (around 10/29/2024) for Recheck with A1c.    Electronically signed by: Dat Manning MD  04/29/2024

## 2024-05-20 RX ORDER — INSULIN DETEMIR 100 [IU]/ML
40 INJECTION, SOLUTION SUBCUTANEOUS 2 TIMES DAILY
Qty: 75 ML | Refills: 1 | Status: SHIPPED | OUTPATIENT
Start: 2024-05-20 | End: 2024-05-24

## 2024-05-20 RX ORDER — INSULIN LISPRO 100 [IU]/ML
INJECTION, SOLUTION INTRAVENOUS; SUBCUTANEOUS
Qty: 45 ML | Refills: 1 | Status: SHIPPED | OUTPATIENT
Start: 2024-05-20 | End: 2024-05-24

## 2024-05-20 NOTE — TELEPHONE ENCOUNTER
Rx Refill Note  Requested Prescriptions      No prescriptions requested or ordered in this encounter        Last office visit with prescribing clinician: 4/29/2024      Next office visit with prescribing clinician: 11/12/2024       Radha Currie (Jodi)  05/20/24, 11:29 EDT

## 2024-05-24 RX ORDER — INSULIN LISPRO 100 [IU]/ML
INJECTION, SOLUTION INTRAVENOUS; SUBCUTANEOUS
Qty: 45 ML | Refills: 1 | Status: SHIPPED | OUTPATIENT
Start: 2024-05-24

## 2024-05-24 RX ORDER — INSULIN DETEMIR 100 [IU]/ML
40 INJECTION, SOLUTION SUBCUTANEOUS 2 TIMES DAILY
Qty: 75 ML | Refills: 1 | Status: SHIPPED | OUTPATIENT
Start: 2024-05-24

## 2024-05-28 RX ORDER — FLURBIPROFEN SODIUM 0.3 MG/ML
SOLUTION/ DROPS OPHTHALMIC
Qty: 450 EACH | Refills: 12 | Status: SHIPPED | OUTPATIENT
Start: 2024-05-28

## 2024-06-03 ENCOUNTER — TELEPHONE (OUTPATIENT)
Dept: ENDOCRINOLOGY | Facility: CLINIC | Age: 69
End: 2024-06-03
Payer: COMMERCIAL

## 2024-06-03 RX ORDER — INSULIN DEGLUDEC 200 U/ML
INJECTION, SOLUTION SUBCUTANEOUS
Qty: 36 ML | Refills: 1 | Status: SHIPPED | OUTPATIENT
Start: 2024-06-03

## 2024-06-03 NOTE — TELEPHONE ENCOUNTER
They have been trying to get his levemir for several weeks now He is out of this  [facility] has faxed us and we have not responded to this fax it was for a PA or request change of the medication    Haris ecall them  back  [phone]

## 2024-08-07 RX ORDER — EMPAGLIFLOZIN, METFORMIN HYDROCHLORIDE 12.5; 1 MG/1; MG/1
2 TABLET, EXTENDED RELEASE ORAL DAILY
Qty: 180 TABLET | Refills: 1 | Status: SHIPPED | OUTPATIENT
Start: 2024-08-07

## 2024-08-07 NOTE — TELEPHONE ENCOUNTER
Rx Refill Note  Requested Prescriptions     Pending Prescriptions Disp Refills    Synjardy XR 12.5-1000 MG tablet sustained-release 24 hour [Pharmacy Med Name: Synjardy Xr 12.5-1000Mg Tablet] 180 tablet 1     Sig: TAKE 2 TABLETS DAILY      Last office visit with prescribing clinician: 4/29/2024   Last telemedicine visit with prescribing clinician: Visit date not found   Next office visit with prescribing clinician: 11/12/2024                         Would you like a call back once the refill request has been completed: [] Yes [] No    If the office needs to give you a call back, can they leave a voicemail: [] Yes [] No    Enzo Junior MA  08/07/24, 10:12 EDT

## 2024-10-11 RX ORDER — INSULIN LISPRO 100 [IU]/ML
INJECTION, SOLUTION INTRAVENOUS; SUBCUTANEOUS
Qty: 45 ML | Refills: 3 | Status: SHIPPED | OUTPATIENT
Start: 2024-10-11

## 2024-10-11 NOTE — TELEPHONE ENCOUNTER
Rx Refill Note  Requested Prescriptions     Pending Prescriptions Disp Refills    Insulin Lispro, 1 Unit Dial, (HUMALOG) 100 UNIT/ML solution pen-injector [Pharmacy Med Name: INSULIN LISPRO KWIKPEN 100 UNIT/ML] 45 mL 1     Sig: INJECT UNDER THE SKIN THREE TIMES A DAY AS NEEDED PER SLIDING SCALE. MAX DOSE OF 44 UNITS PER DAY      Last office visit with prescribing clinician: Visit date not found   Last telemedicine visit with prescribing clinician: Visit date not found   Next office visit with prescribing clinician: Visit date not found                         Would you like a call back once the refill request has been completed: [] Yes [] No    If the office needs to give you a call back, can they leave a voicemail: [] Yes [] No    Terri Ashley MA  10/11/24, 15:23 EDT

## 2024-10-31 RX ORDER — BLOOD-GLUCOSE SENSOR
EACH MISCELLANEOUS
Qty: 6 EACH | Refills: 1 | Status: SHIPPED | OUTPATIENT
Start: 2024-10-31

## 2024-10-31 NOTE — TELEPHONE ENCOUNTER
Rx Refill Note  Requested Prescriptions     Pending Prescriptions Disp Refills    Continuous Glucose Sensor (FreeStyle Samantha 3 Sensor) misc [Pharmacy Med Name: FREESTYLE SAMANTHA 3 SENSOR  MISC] 6 each 3     Sig: USE 1 AS DIRECTED EVERY 14 DAYS      Last office visit with prescribing clinician: 4/29/2024     Next office visit with prescribing clinician: 11/12/2024

## 2024-11-12 ENCOUNTER — OFFICE VISIT (OUTPATIENT)
Dept: ENDOCRINOLOGY | Facility: CLINIC | Age: 69
End: 2024-11-12
Payer: COMMERCIAL

## 2024-11-12 VITALS
BODY MASS INDEX: 27.96 KG/M2 | SYSTOLIC BLOOD PRESSURE: 132 MMHG | WEIGHT: 206.4 LBS | OXYGEN SATURATION: 98 % | HEIGHT: 72 IN | HEART RATE: 47 BPM | DIASTOLIC BLOOD PRESSURE: 76 MMHG

## 2024-11-12 DIAGNOSIS — E11.65 TYPE 2 DIABETES MELLITUS WITH HYPERGLYCEMIA, WITH LONG-TERM CURRENT USE OF INSULIN: Primary | ICD-10-CM

## 2024-11-12 DIAGNOSIS — E78.2 MIXED HYPERLIPIDEMIA: ICD-10-CM

## 2024-11-12 DIAGNOSIS — Z79.4 TYPE 2 DIABETES MELLITUS WITH HYPERGLYCEMIA, WITH LONG-TERM CURRENT USE OF INSULIN: Primary | ICD-10-CM

## 2024-11-12 DIAGNOSIS — Z79.4 TYPE 2 DIABETES MELLITUS WITH HYPOGLYCEMIA WITHOUT COMA, WITH LONG-TERM CURRENT USE OF INSULIN: ICD-10-CM

## 2024-11-12 DIAGNOSIS — E11.3293 TYPE 2 DIABETES MELLITUS WITH BOTH EYES AFFECTED BY MILD NONPROLIFERATIVE RETINOPATHY WITHOUT MACULAR EDEMA, WITH LONG-TERM CURRENT USE OF INSULIN: ICD-10-CM

## 2024-11-12 DIAGNOSIS — I10 BENIGN HYPERTENSION: ICD-10-CM

## 2024-11-12 DIAGNOSIS — E11.649 TYPE 2 DIABETES MELLITUS WITH HYPOGLYCEMIA WITHOUT COMA, WITH LONG-TERM CURRENT USE OF INSULIN: ICD-10-CM

## 2024-11-12 DIAGNOSIS — Z79.4 TYPE 2 DIABETES MELLITUS WITH BOTH EYES AFFECTED BY MILD NONPROLIFERATIVE RETINOPATHY WITHOUT MACULAR EDEMA, WITH LONG-TERM CURRENT USE OF INSULIN: ICD-10-CM

## 2024-11-12 DIAGNOSIS — I25.10 ATHEROSCLEROSIS OF NATIVE CORONARY ARTERY OF NATIVE HEART WITHOUT ANGINA PECTORIS: ICD-10-CM

## 2024-11-12 LAB
EXPIRATION DATE: ABNORMAL
EXPIRATION DATE: NORMAL
GLUCOSE BLDC GLUCOMTR-MCNC: 99 MG/DL (ref 70–130)
HBA1C MFR BLD: 6.1 % (ref 4.5–5.7)
Lab: ABNORMAL
Lab: NORMAL

## 2024-11-12 RX ORDER — INSULIN DEGLUDEC 200 U/ML
INJECTION, SOLUTION SUBCUTANEOUS
Qty: 36 ML | Refills: 3 | Status: SHIPPED | OUTPATIENT
Start: 2024-11-12

## 2024-11-12 NOTE — ASSESSMENT & PLAN NOTE
Diabetes is improving with treatment.   Continue current treatment regimen.  Diabetes will be reassessed in 6 months.    FreeStyle Samantha 3 CGM was downloaded today.  Data was reviewed from 10/30/24 to 11/12/24.  This showed good overnight glucose control.  Some postprandial spikes but no patterns for insulin adjustments.  Time in range was 85%.

## 2024-11-12 NOTE — PROGRESS NOTES
"     Office Note      Date: 2024  Patient Name: River Ching  MRN: [id number]  : 1955    Chief Complaint   Patient presents with    Diabetes     Type II    Hypertension    Hyperlipidemia       History of Present Illness:   River Ching is a 69 y.o. male who presents for Diabetes type 2. Diagnosed in: . Treated in past with oral agents. Current treatments: synjardy and basal bolus insulin. Number of insulin shots per day: 1-3. Checks blood sugar 288 times a day - on FreeStyle Samantha 3+. He is varying his insulin dose based on glucose readings.  Has low blood sugar: occasional. Aspirin use: Yes. Statin use: Yes. ACE-I/ARB use: Yes. Changes in health since last visit: none. Last eye exam 2023     Subjective      Diabetic Complications:  Eyes: Yes - NPDR  Kidneys: No  Feet: No  Heart: Yes - stents    Diet and Exercise:  Meals per day: 3  Minutes of exercise per week: 0 mins.    Review of Systems:   Review of Systems   Constitutional: Negative.    Cardiovascular: Negative.    Gastrointestinal: Negative.    Endocrine: Negative.        The following portions of the patient's history were reviewed and updated as appropriate: allergies, current medications, past family history, past medical history, past social history, past surgical history, and problem list.    Objective     Visit Vitals  /76 (BP Location: Left arm, Patient Position: Sitting, Cuff Size: Adult)   Pulse (!) 47   Ht 182.9 cm (72.01\")   Wt 93.6 kg (206 lb 6.4 oz)   SpO2 98%   BMI 27.99 kg/m²       Physical Exam:  Physical Exam  Constitutional:       Appearance: Normal appearance.   Neurological:      Mental Status: He is alert.         Labs:    HbA1c  Lab Results   Component Value Date    HGBA1C 6.1 (A) 2024       CMP  Lab Results   Component Value Date    GLUCOSE 114 (H) 2024    BUN 12 2024    CREATININE 0.64 (L) 2024    EGFRIFNONA 85 2021    BCR 18.8 2024    K 4.0 2024    CO2 27.3 2024 " "   CALCIUM 9.0 04/29/2024    AST 17 04/29/2024    ALT 9 04/29/2024        Lipid Panel  Lab Results   Component Value Date    HDL Cholesterol 36 (L) 04/29/2024    LDL Cholesterol  54 04/29/2024    LDL/HDL Ratio 1.53 04/29/2024    Triglycerides 70 04/29/2024        TSH  Lab Results   Component Value Date    TSH 1.120 04/29/2024        Hemoglobin A1C  No components found for: \"HGBA1C\"     Microalbumin/Creatinine  Lab Results   Component Value Date    MALBCRERATIO  04/29/2024      Comment:      Unable to calculate    MICROALBUR <1.2 04/29/2024           Assessment / Plan      Assessment & Plan:  Diagnoses and all orders for this visit:    1. Type 2 diabetes mellitus with hyperglycemia, with long-term current use of insulin (Primary)  Assessment & Plan:  Diabetes is improving with treatment.   Continue current treatment regimen.  Diabetes will be reassessed in 6 months.    FreeStyle Samantha 3 CGM was downloaded today.  Data was reviewed from 10/30/24 to 11/12/24.  This showed good overnight glucose control.  Some postprandial spikes but no patterns for insulin adjustments.  Time in range was 85%.    Orders:  -     POC Glucose, Blood  -     POC Glycosylated Hemoglobin (Hb A1C)    2. Benign hypertension  Assessment & Plan:  Hypertension is stable and controlled  Continue current treatment regimen.  Blood pressure will be reassessed in 6 months.      3. Mixed hyperlipidemia  Assessment & Plan:  Continue statin.  Plan to check lipids next visit.      4. Atherosclerosis of native coronary artery of native heart without angina pectoris  Assessment & Plan:  Continue ASA, statin and SGLT-2 inhibitor.      5. Type 2 diabetes mellitus with hypoglycemia without coma, with long-term current use of insulin  Assessment & Plan:  Continue CGM.      6. Type 2 diabetes mellitus with both eyes affected by mild nonproliferative retinopathy without macular edema, with long-term current use of insulin  Assessment & Plan:  Continue ophthalmology " follow up.      Other orders  -     Insulin Degludec (Tresiba FlexTouch) 200 UNIT/ML solution pen-injector pen injection; Inject 80 units daily.   units.  Dispense: 36 mL; Refill: 3      Current Outpatient Medications   Medication Instructions    aspirin 325 MG tablet aspirin 325 mg tablet,delayed release    atorvastatin (LIPITOR) 40 MG tablet daily    Continuous Glucose Sensor (FreeStyle Samantha 3 Sensor) misc USE 1 AS DIRECTED EVERY 14 DAYS    furosemide (LASIX) 20 MG tablet No dose, route, or frequency recorded.    glucose blood (OneTouch Verio) test strip Use 4x per day; ICD-10 E11.65; Z79.4    Insulin Degludec (Tresiba FlexTouch) 200 UNIT/ML solution pen-injector pen injection Inject 80 units daily.   units.    Insulin Lispro, 1 Unit Dial, (HUMALOG) 100 UNIT/ML solution pen-injector INJECT UNDER THE SKIN THREE TIMES A DAY AS NEEDED PER SLIDING SCALE. MAX DOSE OF 44 UNITS PER DAY    Insulin Pen Needle (B-D UF III MINI PEN NEEDLES) 31G X 5 MM misc USE AS DIRECTED 5 TIMES DAILY    losartan (COZAAR) 50 MG tablet     losartan-hydrochlorothiazide (HYZAAR) 50-12.5 MG per tablet daily    metoprolol succinate XL (TOPROL-XL) 100 MG 24 hr tablet No dose, route, or frequency recorded.    Synjardy XR 12.5-1000 MG tablet sustained-release 24 hour 2 tablets, Oral, Daily      Return in about 6 months (around 5/12/2025) for Recheck with A1c, CMP, lipid, TSH, microalbumin, foot exam.    Electronically signed by: Dat Manning MD  11/12/2024

## 2025-01-24 RX ORDER — EMPAGLIFLOZIN, METFORMIN HYDROCHLORIDE 12.5; 1 MG/1; MG/1
2 TABLET, EXTENDED RELEASE ORAL DAILY
Qty: 180 TABLET | Refills: 3 | Status: SHIPPED | OUTPATIENT
Start: 2025-01-24

## 2025-01-24 NOTE — TELEPHONE ENCOUNTER
Rx Refill Note  Requested Prescriptions     Pending Prescriptions Disp Refills    Synjardy XR 12.5-1000 MG tablet sustained-release 24 hour [Pharmacy Med Name: Synjardy Xr 12.5-1000Mg Tablet] 180 tablet 1     Sig: TAKE TWO TABLETS BY MOUTH EVERY DAY          Last office visit with prescribing clinician: 11/12/2024    Next office visit with prescribing clinician: 06/03/2025  }    Richard Figueredo MA  01/24/25, 08:24 EST

## 2025-04-07 RX ORDER — ACYCLOVIR 800 MG/1
TABLET ORAL
Qty: 6 EACH | Refills: 1 | Status: SHIPPED | OUTPATIENT
Start: 2025-04-07

## 2025-04-07 RX ORDER — EMPAGLIFLOZIN, METFORMIN HYDROCHLORIDE 12.5; 1 MG/1; MG/1
2 TABLET, EXTENDED RELEASE ORAL DAILY
Qty: 180 TABLET | Refills: 3 | Status: SHIPPED | OUTPATIENT
Start: 2025-04-07

## 2025-04-07 RX ORDER — INSULIN DEGLUDEC 200 U/ML
INJECTION, SOLUTION SUBCUTANEOUS
Qty: 36 ML | Refills: 3 | Status: SHIPPED | OUTPATIENT
Start: 2025-04-07

## 2025-04-07 NOTE — TELEPHONE ENCOUNTER
Rx Refill Note  Requested Prescriptions     Pending Prescriptions Disp Refills    Insulin Degludec (Tresiba FlexTouch) 200 UNIT/ML solution pen-injector pen injection 36 mL 3     Sig: Inject 80 units daily.   units.    Empagliflozin-metFORMIN HCl ER (Synjardy XR) 12.5-1000 MG tablet sustained-release 24 hour 180 tablet 3     Sig: Take 2 tablets by mouth Daily.      Last office visit with prescribing clinician: 11/12/2024     Next office visit with prescribing clinician: 6/3/2025     Sridevi Gotti MA  04/07/25, 13:09 EDT

## 2025-04-07 NOTE — TELEPHONE ENCOUNTER
Rx Refill Note  Requested Prescriptions     Pending Prescriptions Disp Refills    Continuous Glucose Sensor (FreeStyle Samantha 3 Sensor) misc [Pharmacy Med Name: FREESTYLE SAMANTHA 3 SENSOR  MISC] 6 each 1     Sig: USE 1 AS DIRECTED EVERY 14 DAYS      Last office visit with prescribing clinician: 11/12/2024     Next office visit with prescribing clinician: 6/3/2025     Sridevi Gotti MA  04/07/25, 11:34 EDT

## 2025-06-03 ENCOUNTER — RESULTS FOLLOW-UP (OUTPATIENT)
Dept: ENDOCRINOLOGY | Facility: CLINIC | Age: 70
End: 2025-06-03

## 2025-06-03 ENCOUNTER — OFFICE VISIT (OUTPATIENT)
Dept: ENDOCRINOLOGY | Facility: CLINIC | Age: 70
End: 2025-06-03
Payer: MEDICARE

## 2025-06-03 VITALS
SYSTOLIC BLOOD PRESSURE: 152 MMHG | HEART RATE: 45 BPM | WEIGHT: 202 LBS | DIASTOLIC BLOOD PRESSURE: 70 MMHG | BODY MASS INDEX: 27.36 KG/M2 | OXYGEN SATURATION: 100 % | HEIGHT: 72 IN

## 2025-06-03 DIAGNOSIS — E78.2 MIXED HYPERLIPIDEMIA: ICD-10-CM

## 2025-06-03 DIAGNOSIS — E11.65 TYPE 2 DIABETES MELLITUS WITH HYPERGLYCEMIA, WITH LONG-TERM CURRENT USE OF INSULIN: Primary | ICD-10-CM

## 2025-06-03 DIAGNOSIS — I25.10 ATHEROSCLEROSIS OF NATIVE CORONARY ARTERY OF NATIVE HEART WITHOUT ANGINA PECTORIS: ICD-10-CM

## 2025-06-03 DIAGNOSIS — Z79.4 TYPE 2 DIABETES MELLITUS WITH HYPERGLYCEMIA, WITH LONG-TERM CURRENT USE OF INSULIN: Primary | ICD-10-CM

## 2025-06-03 DIAGNOSIS — Z79.4 TYPE 2 DIABETES MELLITUS WITH BOTH EYES AFFECTED BY MILD NONPROLIFERATIVE RETINOPATHY WITHOUT MACULAR EDEMA, WITH LONG-TERM CURRENT USE OF INSULIN: ICD-10-CM

## 2025-06-03 DIAGNOSIS — I10 BENIGN HYPERTENSION: ICD-10-CM

## 2025-06-03 DIAGNOSIS — E11.3293 TYPE 2 DIABETES MELLITUS WITH BOTH EYES AFFECTED BY MILD NONPROLIFERATIVE RETINOPATHY WITHOUT MACULAR EDEMA, WITH LONG-TERM CURRENT USE OF INSULIN: ICD-10-CM

## 2025-06-03 LAB
ALBUMIN SERPL-MCNC: 4.1 G/DL (ref 3.5–5.2)
ALBUMIN UR-MCNC: <1.2 MG/DL
ALBUMIN/GLOB SERPL: 1.4 G/DL
ALP SERPL-CCNC: 95 U/L (ref 39–117)
ALT SERPL W P-5'-P-CCNC: 16 U/L (ref 1–41)
ANION GAP SERPL CALCULATED.3IONS-SCNC: 12.9 MMOL/L (ref 5–15)
AST SERPL-CCNC: 21 U/L (ref 1–40)
BILIRUB SERPL-MCNC: 1 MG/DL (ref 0–1.2)
BUN SERPL-MCNC: 10 MG/DL (ref 8–23)
BUN/CREAT SERPL: 12.5 (ref 7–25)
CALCIUM SPEC-SCNC: 9.4 MG/DL (ref 8.6–10.5)
CHLORIDE SERPL-SCNC: 105 MMOL/L (ref 98–107)
CHOLEST SERPL-MCNC: 99 MG/DL (ref 0–200)
CO2 SERPL-SCNC: 26.1 MMOL/L (ref 22–29)
CREAT SERPL-MCNC: 0.8 MG/DL (ref 0.76–1.27)
CREAT UR-MCNC: 62.5 MG/DL
EGFRCR SERPLBLD CKD-EPI 2021: 94.6 ML/MIN/1.73
EXPIRATION DATE: ABNORMAL
EXPIRATION DATE: NORMAL
GLOBULIN UR ELPH-MCNC: 2.9 GM/DL
GLUCOSE BLDC GLUCOMTR-MCNC: 111 MG/DL (ref 70–130)
GLUCOSE SERPL-MCNC: 101 MG/DL (ref 65–99)
HBA1C MFR BLD: 6.5 % (ref 4.5–5.7)
HDLC SERPL-MCNC: 35 MG/DL (ref 40–60)
LDLC SERPL CALC-MCNC: 47 MG/DL (ref 0–100)
LDLC/HDLC SERPL: 1.33 {RATIO}
Lab: ABNORMAL
Lab: NORMAL
MICROALBUMIN/CREAT UR: NORMAL MG/G{CREAT}
POTASSIUM SERPL-SCNC: 4.2 MMOL/L (ref 3.5–5.2)
PROT SERPL-MCNC: 7 G/DL (ref 6–8.5)
SODIUM SERPL-SCNC: 144 MMOL/L (ref 136–145)
TRIGL SERPL-MCNC: 88 MG/DL (ref 0–150)
TSH SERPL DL<=0.05 MIU/L-ACNC: 1.53 UIU/ML (ref 0.27–4.2)
VLDLC SERPL-MCNC: 17 MG/DL (ref 5–40)

## 2025-06-03 PROCEDURE — 95251 CONT GLUC MNTR ANALYSIS I&R: CPT | Performed by: INTERNAL MEDICINE

## 2025-06-03 PROCEDURE — 84443 ASSAY THYROID STIM HORMONE: CPT | Performed by: INTERNAL MEDICINE

## 2025-06-03 PROCEDURE — 82043 UR ALBUMIN QUANTITATIVE: CPT | Performed by: INTERNAL MEDICINE

## 2025-06-03 PROCEDURE — 1159F MED LIST DOCD IN RCRD: CPT | Performed by: INTERNAL MEDICINE

## 2025-06-03 PROCEDURE — 3044F HG A1C LEVEL LT 7.0%: CPT | Performed by: INTERNAL MEDICINE

## 2025-06-03 PROCEDURE — 83036 HEMOGLOBIN GLYCOSYLATED A1C: CPT | Performed by: INTERNAL MEDICINE

## 2025-06-03 PROCEDURE — 99214 OFFICE O/P EST MOD 30 MIN: CPT | Performed by: INTERNAL MEDICINE

## 2025-06-03 PROCEDURE — 1160F RVW MEDS BY RX/DR IN RCRD: CPT | Performed by: INTERNAL MEDICINE

## 2025-06-03 PROCEDURE — 3077F SYST BP >= 140 MM HG: CPT | Performed by: INTERNAL MEDICINE

## 2025-06-03 PROCEDURE — 3078F DIAST BP <80 MM HG: CPT | Performed by: INTERNAL MEDICINE

## 2025-06-03 PROCEDURE — 36415 COLL VENOUS BLD VENIPUNCTURE: CPT | Performed by: INTERNAL MEDICINE

## 2025-06-03 PROCEDURE — 80053 COMPREHEN METABOLIC PANEL: CPT | Performed by: INTERNAL MEDICINE

## 2025-06-03 PROCEDURE — 82570 ASSAY OF URINE CREATININE: CPT | Performed by: INTERNAL MEDICINE

## 2025-06-03 PROCEDURE — 80061 LIPID PANEL: CPT | Performed by: INTERNAL MEDICINE

## 2025-06-03 NOTE — ASSESSMENT & PLAN NOTE
Diabetes is stable.   Continue current treatment regimen.  Diabetes will be reassessed in 6 months.    FreeStyle Samantha 3 CGM was downloaded today.  Data was reviewed from 5/21/25 to 6/3/25.  This showed fairly good glucose control.  Occ postprandial spike but no patterns for medication adjustments.  Time in range was 76%.

## 2025-06-03 NOTE — ASSESSMENT & PLAN NOTE
SBP elevated today but DBP okay.  He reports BP at home has been okay.  Continue current meds.  Monitor BP at home.

## 2025-06-03 NOTE — PROGRESS NOTES
"     Office Note      Date: 2025  Patient Name: River Ching  MRN: [id number]  : 1955    Chief Complaint   Patient presents with    Diabetes     Type 2 diabetes mellitus with hyperglycemia, with long-term current use of insulin    Hypertension    Hyperlipidemia       History of Present Illness:   River Ching is a 70 y.o. male who presents for Diabetes type 2. Diagnosed in: . Treated in past with oral agents. Current treatments: synjardy and basal bolus insulin. Number of insulin shots per day: 1-3. Checks blood sugar 288 times a day - on FreeStyle Samantha 3+. He is varying his insulin dose based on glucose readings.  Has low blood sugar: occasional. Aspirin use: Yes. Statin use: Yes. ACE-I/ARB use: Yes. Changes in health since last visit: none. Last eye exam 2023     He has retired after working 50 years in the [email or web address].    Subjective      Diabetic Complications:  Eyes: Yes - NPDR  Kidneys: No  Feet: No  Heart: Yes - stents    Diet and Exercise:  Meals per day: 3  Minutes of exercise per week: 0 mins.    Review of Systems:   Review of Systems   Constitutional: Negative.    Cardiovascular: Negative.    Gastrointestinal: Negative.    Endocrine: Negative.        The following portions of the patient's history were reviewed and updated as appropriate: allergies, current medications, past family history, past medical history, past social history, past surgical history, and problem list.    Objective     Visit Vitals  /70 (BP Location: Left arm, Patient Position: Sitting, Cuff Size: Adult)   Pulse (!) 45   Ht 182.9 cm (72\")   Wt 91.6 kg (202 lb)   SpO2 100%   BMI 27.40 kg/m²       Physical Exam:  Physical Exam  Constitutional:       Appearance: Normal appearance.   Cardiovascular:      Pulses:           Dorsalis pedis pulses are 2+ on the right side and 2+ on the left side.        Posterior tibial pulses are 2+ on the right side and 2+ on the left side.   Feet:      Right foot:      Protective " "Sensation: 5 sites tested.  5 sites sensed.      Skin integrity: Skin integrity normal.      Toenail Condition: Right toenails are normal.      Left foot:      Protective Sensation: 5 sites tested.  5 sites sensed.      Skin integrity: Skin integrity normal.      Toenail Condition: Left toenails are normal.   Neurological:      Mental Status: He is alert.         Labs:    HbA1c  Lab Results   Component Value Date    HGBA1C 6.5 (A) 06/03/2025       CMP  Lab Results   Component Value Date    GLUCOSE 114 (H) 04/29/2024    BUN 12 04/29/2024    CREATININE 0.64 (L) 04/29/2024    EGFRIFNONA 85 08/17/2021    BCR 18.8 04/29/2024    K 4.0 04/29/2024    CO2 27.3 04/29/2024    CALCIUM 9.0 04/29/2024    AST 17 04/29/2024    ALT 9 04/29/2024        Lipid Panel  Lab Results   Component Value Date    HDL Cholesterol 36 (L) 04/29/2024    LDL Cholesterol  54 04/29/2024    LDL/HDL Ratio 1.53 04/29/2024    Triglycerides 70 04/29/2024        TSH  Lab Results   Component Value Date    TSH 1.120 04/29/2024        Hemoglobin A1C  No components found for: \"HGBA1C\"     Microalbumin/Creatinine  Lab Results   Component Value Date    MALBCRERATIO  04/29/2024      Comment:      Unable to calculate    MICROALBUR <1.2 04/29/2024           Assessment / Plan      Assessment & Plan:  Diagnoses and all orders for this visit:    1. Type 2 diabetes mellitus with hyperglycemia, with long-term current use of insulin (Primary)  Assessment & Plan:  Diabetes is stable.   Continue current treatment regimen.  Diabetes will be reassessed in 6 months.    FreeStyle Samantha 3 CGM was downloaded today.  Data was reviewed from 5/21/25 to 6/3/25.  This showed fairly good glucose control.  Occ postprandial spike but no patterns for medication adjustments.  Time in range was 76%.    Orders:  -     POC Glucose, Blood  -     POC Glycosylated Hemoglobin (Hb A1C)  -     Comprehensive Metabolic Panel; Future  -     Lipid Panel; Future  -     Microalbumin / Creatinine Urine " Ratio - Urine, Clean Catch; Future  -     TSH; Future    2. Benign hypertension  Assessment & Plan:  SBP elevated today but DBP okay.  He reports BP at home has been okay.  Continue current meds.  Monitor BP at home.      3. Mixed hyperlipidemia  Assessment & Plan:  Continue statin.  Check lipids.      4. Atherosclerosis of native coronary artery of native heart without angina pectoris  Assessment & Plan:  Continue ASA, statin and SGLT-2 inhibitor.   Continue cardiology follow up.      5. Type 2 diabetes mellitus with both eyes affected by mild nonproliferative retinopathy without macular edema, with long-term current use of insulin  Assessment & Plan:  Continue ophthalmology follow up.        Current Outpatient Medications   Medication Instructions    aspirin 325 MG tablet aspirin 325 mg tablet,delayed release    atorvastatin (LIPITOR) 40 MG tablet daily    Continuous Glucose Sensor (FreeStyle Samantha 3 Sensor) misc USE 1 AS DIRECTED EVERY 14 DAYS    Empagliflozin-metFORMIN HCl ER (Synjardy XR) 12.5-1000 MG tablet sustained-release 24 hour 2 tablets, Oral, Daily    furosemide (LASIX) 20 MG tablet No dose, route, or frequency recorded.    glucose blood (OneTouch Verio) test strip Use 4x per day; ICD-10 E11.65; Z79.4    Insulin Degludec (Tresiba FlexTouch) 200 UNIT/ML solution pen-injector pen injection Inject 80 units daily.   units.    Insulin Lispro, 1 Unit Dial, (HUMALOG) 100 UNIT/ML solution pen-injector INJECT UNDER THE SKIN THREE TIMES A DAY AS NEEDED PER SLIDING SCALE. MAX DOSE OF 44 UNITS PER DAY    Insulin Pen Needle (B-D UF III MINI PEN NEEDLES) 31G X 5 MM misc USE AS DIRECTED 5 TIMES DAILY    losartan (COZAAR) 50 MG tablet     losartan-hydrochlorothiazide (HYZAAR) 50-12.5 MG per tablet daily    metoprolol succinate XL (TOPROL-XL) 100 MG 24 hr tablet No dose, route, or frequency recorded.      Return in about 6 months (around 12/3/2025) for Recheck with A1c.    Electronically signed by: Dat Interiano  MD Kerri  06/03/2025

## 2025-06-24 RX ORDER — EMPAGLIFLOZIN, METFORMIN HYDROCHLORIDE 12.5; 1 MG/1; MG/1
2 TABLET, EXTENDED RELEASE ORAL DAILY
Qty: 180 TABLET | Refills: 6 | Status: SHIPPED | OUTPATIENT
Start: 2025-06-24

## 2025-08-05 RX ORDER — ACYCLOVIR 800 MG/1
1 TABLET ORAL
Qty: 6 EACH | Refills: 1 | Status: SHIPPED | OUTPATIENT
Start: 2025-08-05

## 2025-08-12 ENCOUNTER — TELEPHONE (OUTPATIENT)
Dept: ENDOCRINOLOGY | Facility: CLINIC | Age: 70
End: 2025-08-12
Payer: COMMERCIAL

## 2025-08-12 RX ORDER — ACYCLOVIR 800 MG/1
1 TABLET ORAL
Qty: 6 EACH | Refills: 1 | Status: SHIPPED | OUTPATIENT
Start: 2025-08-12